# Patient Record
Sex: FEMALE | Race: BLACK OR AFRICAN AMERICAN | Employment: OTHER | URBAN - METROPOLITAN AREA
[De-identification: names, ages, dates, MRNs, and addresses within clinical notes are randomized per-mention and may not be internally consistent; named-entity substitution may affect disease eponyms.]

---

## 2017-02-02 ENCOUNTER — COMMUNICATION - HEALTHEAST (OUTPATIENT)
Dept: INTERNAL MEDICINE | Facility: CLINIC | Age: 72
End: 2017-02-02

## 2017-02-02 DIAGNOSIS — E55.9 VITAMIN D DEFICIENCY: ICD-10-CM

## 2017-03-02 ENCOUNTER — OFFICE VISIT - HEALTHEAST (OUTPATIENT)
Dept: PODIATRY | Facility: CLINIC | Age: 72
End: 2017-03-02

## 2017-03-02 DIAGNOSIS — L57.0 KERATOMA: ICD-10-CM

## 2017-03-07 ENCOUNTER — COMMUNICATION - HEALTHEAST (OUTPATIENT)
Dept: INTERNAL MEDICINE | Facility: CLINIC | Age: 72
End: 2017-03-07

## 2017-03-07 DIAGNOSIS — I63.50 UNSPECIFIED CEREBRAL ARTERY OCCLUSION WITH CEREBRAL INFARCTION: ICD-10-CM

## 2017-03-10 ENCOUNTER — COMMUNICATION - HEALTHEAST (OUTPATIENT)
Dept: INTERNAL MEDICINE | Facility: CLINIC | Age: 72
End: 2017-03-10

## 2017-03-10 DIAGNOSIS — I63.50 UNSPECIFIED CEREBRAL ARTERY OCCLUSION WITH CEREBRAL INFARCTION: ICD-10-CM

## 2017-03-21 ENCOUNTER — COMMUNICATION - HEALTHEAST (OUTPATIENT)
Dept: INTERNAL MEDICINE | Facility: CLINIC | Age: 72
End: 2017-03-21

## 2017-03-21 DIAGNOSIS — Z00.00 HEALTH CARE MAINTENANCE: ICD-10-CM

## 2017-05-02 ENCOUNTER — AMBULATORY - HEALTHEAST (OUTPATIENT)
Dept: INTERNAL MEDICINE | Facility: CLINIC | Age: 72
End: 2017-05-02

## 2017-05-02 ENCOUNTER — COMMUNICATION - HEALTHEAST (OUTPATIENT)
Dept: INTERNAL MEDICINE | Facility: CLINIC | Age: 72
End: 2017-05-02

## 2017-05-02 DIAGNOSIS — Z12.11 ENCOUNTER FOR COLORECTAL CANCER SCREENING: ICD-10-CM

## 2017-05-02 DIAGNOSIS — Z12.12 ENCOUNTER FOR COLORECTAL CANCER SCREENING: ICD-10-CM

## 2017-05-25 ENCOUNTER — OFFICE VISIT - HEALTHEAST (OUTPATIENT)
Dept: INTERNAL MEDICINE | Facility: CLINIC | Age: 72
End: 2017-05-25

## 2017-05-25 ENCOUNTER — AMBULATORY - HEALTHEAST (OUTPATIENT)
Dept: PODIATRY | Facility: CLINIC | Age: 72
End: 2017-05-25

## 2017-05-25 DIAGNOSIS — K21.9 GASTROESOPHAGEAL REFLUX DISEASE WITHOUT ESOPHAGITIS: ICD-10-CM

## 2017-05-25 DIAGNOSIS — I63.50 UNSPECIFIED CEREBRAL ARTERY OCCLUSION WITH CEREBRAL INFARCTION: ICD-10-CM

## 2017-05-25 DIAGNOSIS — E55.9 VITAMIN D DEFICIENCY: ICD-10-CM

## 2017-05-25 DIAGNOSIS — L57.0 KERATOMA: ICD-10-CM

## 2017-05-25 DIAGNOSIS — Z00.00 HEALTH CARE MAINTENANCE: ICD-10-CM

## 2017-05-25 DIAGNOSIS — G44.221 CHRONIC TENSION-TYPE HEADACHE, INTRACTABLE: ICD-10-CM

## 2017-05-25 LAB
CHOLEST SERPL-MCNC: 135 MG/DL
FASTING STATUS PATIENT QL REPORTED: NORMAL
HDLC SERPL-MCNC: 56 MG/DL
LDLC SERPL CALC-MCNC: 69 MG/DL
TRIGL SERPL-MCNC: 49 MG/DL

## 2017-05-31 ENCOUNTER — COMMUNICATION - HEALTHEAST (OUTPATIENT)
Dept: INTERNAL MEDICINE | Facility: CLINIC | Age: 72
End: 2017-05-31

## 2017-06-09 ENCOUNTER — COMMUNICATION - HEALTHEAST (OUTPATIENT)
Dept: INTERNAL MEDICINE | Facility: CLINIC | Age: 72
End: 2017-06-09

## 2017-06-09 DIAGNOSIS — K21.9 GASTROESOPHAGEAL REFLUX DISEASE WITHOUT ESOPHAGITIS: ICD-10-CM

## 2017-07-31 ENCOUNTER — COMMUNICATION - HEALTHEAST (OUTPATIENT)
Dept: INTERNAL MEDICINE | Facility: CLINIC | Age: 72
End: 2017-07-31

## 2017-11-15 ENCOUNTER — AMBULATORY - HEALTHEAST (OUTPATIENT)
Dept: PODIATRY | Facility: CLINIC | Age: 72
End: 2017-11-15

## 2017-11-15 DIAGNOSIS — L57.0 KERATOMA: ICD-10-CM

## 2017-12-29 ENCOUNTER — RECORDS - HEALTHEAST (OUTPATIENT)
Dept: ADMINISTRATIVE | Facility: OTHER | Age: 72
End: 2017-12-29

## 2018-02-13 ENCOUNTER — COMMUNICATION - HEALTHEAST (OUTPATIENT)
Dept: INTERNAL MEDICINE | Facility: CLINIC | Age: 73
End: 2018-02-13

## 2018-02-13 DIAGNOSIS — E55.9 VITAMIN D DEFICIENCY: ICD-10-CM

## 2018-02-17 ENCOUNTER — COMMUNICATION - HEALTHEAST (OUTPATIENT)
Dept: INTERNAL MEDICINE | Facility: CLINIC | Age: 73
End: 2018-02-17

## 2018-03-15 ENCOUNTER — COMMUNICATION - HEALTHEAST (OUTPATIENT)
Dept: ADMINISTRATIVE | Facility: CLINIC | Age: 73
End: 2018-03-15

## 2018-03-19 ENCOUNTER — AMBULATORY - HEALTHEAST (OUTPATIENT)
Dept: PODIATRY | Facility: CLINIC | Age: 73
End: 2018-03-19

## 2018-03-19 ENCOUNTER — OFFICE VISIT - HEALTHEAST (OUTPATIENT)
Dept: INTERNAL MEDICINE | Facility: CLINIC | Age: 73
End: 2018-03-19

## 2018-03-19 DIAGNOSIS — I63.50 CEREBRAL ARTERY OCCLUSION WITH CEREBRAL INFARCTION (H): ICD-10-CM

## 2018-03-19 DIAGNOSIS — H54.8 LEGAL BLINDNESS, AS DEFINED IN USA: ICD-10-CM

## 2018-03-19 DIAGNOSIS — H61.23 BILATERAL IMPACTED CERUMEN: ICD-10-CM

## 2018-03-19 DIAGNOSIS — J45.909 ASTHMA: ICD-10-CM

## 2018-03-19 DIAGNOSIS — Z12.11 COLON CANCER SCREENING: ICD-10-CM

## 2018-03-19 DIAGNOSIS — Z13.220 ENCOUNTER FOR SCREENING FOR LIPOID DISORDERS: ICD-10-CM

## 2018-03-19 DIAGNOSIS — L57.0 KERATOMA: ICD-10-CM

## 2018-03-19 DIAGNOSIS — Z00.00 ROUTINE GENERAL MEDICAL EXAMINATION AT A HEALTH CARE FACILITY: ICD-10-CM

## 2018-03-19 DIAGNOSIS — H91.90 HEARING LOSS: ICD-10-CM

## 2018-03-19 LAB
ALBUMIN SERPL-MCNC: 3.7 G/DL (ref 3.5–5)
ALP SERPL-CCNC: 91 U/L (ref 45–120)
ALT SERPL W P-5'-P-CCNC: 23 U/L (ref 0–45)
ANION GAP SERPL CALCULATED.3IONS-SCNC: 11 MMOL/L (ref 5–18)
AST SERPL W P-5'-P-CCNC: 20 U/L (ref 0–40)
BILIRUB SERPL-MCNC: 0.9 MG/DL (ref 0–1)
BUN SERPL-MCNC: 9 MG/DL (ref 8–28)
CALCIUM SERPL-MCNC: 9.2 MG/DL (ref 8.5–10.5)
CHLORIDE BLD-SCNC: 106 MMOL/L (ref 98–107)
CHOLEST SERPL-MCNC: 150 MG/DL
CO2 SERPL-SCNC: 24 MMOL/L (ref 22–31)
CREAT SERPL-MCNC: 0.63 MG/DL (ref 0.6–1.1)
ERYTHROCYTE [DISTWIDTH] IN BLOOD BY AUTOMATED COUNT: 11.9 % (ref 11–14.5)
FASTING STATUS PATIENT QL REPORTED: YES
GFR SERPL CREATININE-BSD FRML MDRD: >60 ML/MIN/1.73M2
GLUCOSE BLD-MCNC: 89 MG/DL (ref 70–125)
HCT VFR BLD AUTO: 41.7 % (ref 35–47)
HDLC SERPL-MCNC: 56 MG/DL
HGB BLD-MCNC: 14.3 G/DL (ref 12–16)
LDLC SERPL CALC-MCNC: 83 MG/DL
MCH RBC QN AUTO: 29.7 PG (ref 27–34)
MCHC RBC AUTO-ENTMCNC: 34.2 G/DL (ref 32–36)
MCV RBC AUTO: 87 FL (ref 80–100)
PLATELET # BLD AUTO: 147 THOU/UL (ref 140–440)
PMV BLD AUTO: 9.5 FL (ref 7–10)
POTASSIUM BLD-SCNC: 4.2 MMOL/L (ref 3.5–5)
PROT SERPL-MCNC: 7.1 G/DL (ref 6–8)
RBC # BLD AUTO: 4.8 MILL/UL (ref 3.8–5.4)
SODIUM SERPL-SCNC: 141 MMOL/L (ref 136–145)
TRIGL SERPL-MCNC: 57 MG/DL
WBC: 2.4 THOU/UL (ref 4–11)

## 2018-03-19 ASSESSMENT — MIFFLIN-ST. JEOR: SCORE: 1227.02

## 2018-03-21 ENCOUNTER — COMMUNICATION - HEALTHEAST (OUTPATIENT)
Dept: INTERNAL MEDICINE | Facility: CLINIC | Age: 73
End: 2018-03-21

## 2018-05-23 ENCOUNTER — RECORDS - HEALTHEAST (OUTPATIENT)
Dept: ADMINISTRATIVE | Facility: OTHER | Age: 73
End: 2018-05-23

## 2018-07-05 ENCOUNTER — OFFICE VISIT - HEALTHEAST (OUTPATIENT)
Dept: INTERNAL MEDICINE | Facility: CLINIC | Age: 73
End: 2018-07-05

## 2018-07-05 DIAGNOSIS — Z86.73 HISTORY OF STROKE: ICD-10-CM

## 2018-07-05 DIAGNOSIS — H54.8 LEGALLY BLIND: ICD-10-CM

## 2018-07-05 DIAGNOSIS — Z86.73 HISTORY OF TIA (TRANSIENT ISCHEMIC ATTACK) AND STROKE: ICD-10-CM

## 2018-07-05 DIAGNOSIS — R41.3 MEMORY LOSS: ICD-10-CM

## 2018-07-05 DIAGNOSIS — G81.90 HEMIPARESIS (H): ICD-10-CM

## 2018-07-16 ENCOUNTER — OFFICE VISIT (OUTPATIENT)
Dept: PODIATRY | Facility: CLINIC | Age: 73
End: 2018-07-16
Payer: COMMERCIAL

## 2018-07-16 VITALS
DIASTOLIC BLOOD PRESSURE: 68 MMHG | HEIGHT: 63 IN | BODY MASS INDEX: 30.94 KG/M2 | WEIGHT: 174.6 LBS | SYSTOLIC BLOOD PRESSURE: 104 MMHG

## 2018-07-16 DIAGNOSIS — L84 CALLUS OF FOOT: ICD-10-CM

## 2018-07-16 DIAGNOSIS — M79.671 RIGHT FOOT PAIN: Primary | ICD-10-CM

## 2018-07-16 PROCEDURE — 11055 PARING/CUTG B9 HYPRKER LES 1: CPT | Performed by: PODIATRIST

## 2018-07-16 PROCEDURE — 99213 OFFICE O/P EST LOW 20 MIN: CPT | Mod: 25 | Performed by: PODIATRIST

## 2018-07-16 RX ORDER — ATORVASTATIN CALCIUM 10 MG/1
TABLET, FILM COATED ORAL
COMMUNITY
Start: 2017-05-25 | End: 2020-12-17

## 2018-07-16 NOTE — PATIENT INSTRUCTIONS
CALLUS / CORNS / IPKs  When there is excessive friction or pressure on the skin, the body responds by making the skin thicker to protect the deeper structures from becoming exposed. While this works well to protect the deeper structures, the thickened skin can increase pressure and pain.    CALLUS: Flat, diffuse thickening are simple calluses and they are usually caused by friction. Often these are the result of rubbing on a shoe or going barefoot.    CORNS: Calluses with a central core between the toes are called corns. These result from prominent joints on adjacent toes rubbing together. Theses are a symptom of bone malalignment and will always recur unless the underlying bones are addressed surgically.    IPKs: Calluses with a central core on the ball of the foot are usually IPKs (intractable plantar keratosis). These are caused by excessive pressure from the metatarsals, the bones that make up the ball of the foot. Often one of these bones is too long or too prominent.  Again, these will always recur unless the underlying bone issue is addressed. There is no cure for these. They will either go away by themselves, recur, or more could develop.    ROUTINE MAINTENANCE  1. File them down with a pumice stone or callus file a couple times a week.   2. An electric callus removing device. Amope Pedi Perfect Electronic Pedicure Foot File and Callus Remover can be a good option.   3. Lotion can be applied to soften the callus. A urea based cream such as Kersal or Vanicream or thicker cream with shea butter are good options.  4. Toe spacers or toe covers can be used for corns, gel pads can be used for other lesions on the bottom of the foot.   If there is a surgical pathology noted, such as a prominent bone, often this needs to be addressed surgically to minimize recurrence. However, sometimes the lesion simply migrates to another spot after surgery, so it is not a guaranteed cure.       [facility]  LOCATIONS  [facility]  [address]  [place] [address]  Phone: [phone]  Fax: [phone] [facility]  [address]  [place] [address]  Phone: [phone]   Fax: [phone]   [facility]  [address]  [place] [address]  Phone: [phone]  Fax: [phone] [facility]  [address]  [place] [address]  Phone: [phone]   Fax: [phone]   [facility]   [address]  [place] [address]  Phone: [phone]   Fax: [phone] * Please call any location listed to make an appointment for a casting/fitting. Your referral was sent to their central office and they will all have the order on file.

## 2018-07-16 NOTE — MR AVS SNAPSHOT
After Visit Summary   7/16/2018    Toshia Vela    MRN: [id number]           Patient Information     Date Of Birth          1945        Visit Information        Provider Department      7/16/2018 2:30 PM Mason Tobias DPM; [employer] [facility]        Care Instructions    CALLUS / CORNS / IPKs  When there is excessive friction or pressure on the skin, the body responds by making the skin thicker to protect the deeper structures from becoming exposed. While this works well to protect the deeper structures, the thickened skin can increase pressure and pain.    CALLUS: Flat, diffuse thickening are simple calluses and they are usually caused by friction. Often these are the result of rubbing on a shoe or going barefoot.    CORNS: Calluses with a central core between the toes are called corns. These result from prominent joints on adjacent toes rubbing together. Theses are a symptom of bone malalignment and will always recur unless the underlying bones are addressed surgically.    IPKs: Calluses with a central core on the ball of the foot are usually IPKs (intractable plantar keratosis). These are caused by excessive pressure from the metatarsals, the bones that make up the ball of the foot. Often one of these bones is too long or too prominent.  Again, these will always recur unless the underlying bone issue is addressed. There is no cure for these. They will either go away by themselves, recur, or more could develop.    ROUTINE MAINTENANCE  1. File them down with a pumice stone or callus file a couple times a week.   2. An electric callus removing device. Amope Pedi Perfect Electronic Pedicure Foot File and Callus Remover can be a good option.   3. Lotion can be applied to soften the callus. A urea based cream such as Kersal or Vanicream or thicker cream with shea butter are good options.  4. Toe spacers or toe covers can be used for corns, gel pads  can be used for other lesions on the bottom of the foot.   If there is a surgical pathology noted, such as a prominent bone, often this needs to be addressed surgically to minimize recurrence. However, sometimes the lesion simply migrates to another spot after surgery, so it is not a guaranteed cure.       [facility] LOCATIONS  [facility]  [address]  [place] [address]  Phone: [phone]  Fax: [phone] [facility]  [address]  [place] [address]  Phone: [phone]   Fax: [phone]   [facility]  [address]  [place] [address]  Phone: [phone]  Fax: [phone] [facility]  [address]  [place] [address]  Phone: [phone]   Fax: [phone]   [facility]   [address]  [place] [address]  Phone: [phone]   Fax: [phone] * Please call any location listed to make an appointment for a casting/fitting. Your referral was sent to their central office and they will all have the order on file.             Follow-ups after your visit        Who to contact     If you have questions or need follow up information about today's clinic visit or your schedule please contact [facility] directly at [phone].  Normal or non-critical lab and imaging results will be communicated to you by MyChart, letter or phone within 4 business days after the clinic has received the results. If you do not hear from us within 7 days, please contact the clinic through MyChart or phone. If you have a critical or abnormal lab result, we will notify you by phone as soon as possible.  Submit refill requests through [employer] or call your pharmacy and they will forward the refill request to us. Please allow 3 business days for your refill to be completed.          Additional Information About Your Visit        [employer]  "Information     [employer] lets you send messages to your doctor, view your test results, renew your prescriptions, schedule appointments and more. To sign up, go to [email or web address] . Click on \"Log in\" on the left side of the screen, which will take you to the Welcome page. Then click on \"Sign up Now\" on the right side of the page.     You will be asked to enter the access code listed below, as well as some personal information. Please follow the directions to create your username and password.     Your access code is: [email or web address]  Expires: 10/14/2018  3:31 PM     Your access code will  in 90 days. If you need help or a new code, please call your [place] clinic or [phone].        Care EveryWhere ID     This is your Care EveryWhere ID. This could be used by other organizations to access your [place] medical records  [id number]        Your Vitals Were     Height BMI (Body Mass Index)                5' 3\" (1.6 m) 30.93 kg/m2           Blood Pressure from Last 3 Encounters:   18 104/68   09/21/15 128/80   14 116/68    Weight from Last 3 Encounters:   18 174 lb 9.6 oz (79.2 kg)   14 160 lb 14.4 oz (73 kg)   14 161 lb (73 kg)              Today, you had the following     No orders found for display       Primary Care Provider Office Phone # Fax #    [facility] [phone]       [address] [address]        Equal Access to Services     [facility] CenterELIZABETH : Hadii jett Lucio, waaxda luqjus, qaybta kaalshania escobar, karen brothers. So [facility] [phone].    ATENCIÓN: Si habla español, tiene a castro disposición servicios gratuitos de asistencia lingüística. Llame al [phone].    We comply with applicable federal civil rights laws and [place] laws. We do not discriminate on the basis of race, color, national origin, age, disability, sex, sexual orientation, or gender identity.       "      Thank you!     Thank you for choosing [facility]  for your care. Our goal is always to provide you with excellent care. Hearing back from our patients is one way we can continue to improve our services. Please take a few minutes to complete the written survey that you may receive in the mail after your visit with us. Thank you!             Your Updated Medication List - Protect others around you: Learn how to safely use, store and throw away your medicines at www.disposemymeds.org.          This list is accurate as of 7/16/18  3:31 PM.  Always use your most recent med list.                   Brand Name Dispense Instructions for use Diagnosis    aspirin-dipyridamole  MG per 12 hr capsule    AGGRENOX    180 capsule    Take 1 capsule by mouth 2 times daily    Stroke, paralytic (H)       atorvastatin 10 MG tablet    LIPITOR     TAKE 1 TABLET DAILY-Please Schedule Physical Exam & Fasting Labs        Calcium carb-Vitamin D 500 mg [place]-200 units 500-200 MG-UNIT per tablet    OSCAL with D;Oyster Shell Calcium    100 tablet    Take 1 tablet by mouth 2 times daily (with meals)    Osteoporosis       CETAPHIL Crea     1 Bottle    Use it daily to the dry skin    Dry skin dermatitis       cholecalciferol 1000 UNIT tablet    vitamin D3    100 tablet    Take 1 tablet by mouth daily.    Osteopenia       erythromycin ophthalmic ointment    ROMYCIN    3.5 g    Place  into both eyes At Bedtime.        fluticasone-salmeterol 250-50 MCG/DOSE diskus inhaler    ADVAIR    1 Inhaler    Inhale 1 puff into the lungs 2 times daily Need to make appointment    Mild intermittent asthma       omeprazole 20 MG CR capsule    priLOSEC    30 capsule    Take 1 capsule (20 mg) by mouth daily Need office visit    GERD (gastroesophageal reflux disease)       PRED FORTE 1 % ophthalmic susp   Generic drug:  prednisoLONE acetate      Place 1 drop into the right eye 2 times daily        triamcinolone 0.1 % cream    KENALOG     30 g    Apply sparingly to affected area twice a day    Skin rash       UNKNOWN TO PATIENT      Medication for hypertension

## 2018-07-16 NOTE — PROGRESS NOTES
"  ASSESSMENT/PLAN:    Encounter Diagnoses   Name Primary?     Right foot pain Yes     Callus of foot       was very helpful with our communication.  I explained that the lesion is not a plantar wart and that freezing it would not resolve the problem. I explained that the lesion is from high pressure in that location. This might be related to thinning of the plantar fat pad and the underlying sesamoid bone.    I paired the lesion down with a # 15 scalpel. No bleeding.  It was recommended that she file it with a pumice stone.  We discussed cushioned inserts with an aperture cut below the lesion.  It was explained that stiffer soled shoes might help reduce pressure on the forefoot.    Pt is referred to the [facility] for prescription orthoses.      Body mass index is 30.93 kg/(m^2).    Weight management plan: Patient was referred to their PCP to discuss a diet and exercise plan.      Mason Tobias DPM, FACFAS, MS    [facility]      ____________________________________________________________________    HPI:         Chief Complaint: pain, bottom of right foot. She has a lesion and inquires about \"freezing\" it.  Onset of problem: 5 years  Ratin/10   Frequency:  daily    The pain is made worse with walking    *  Patient Active Problem List   Diagnosis     Stroke, paralytic (H)     Seasonal allergies     Hypertension goal BP (blood pressure) < 130/80     Mild intermittent asthma     GERD (gastroesophageal reflux disease)     Ankle fracture, right     Chronic headaches     Health Care Home     Weakness of left side of body     Osteopenia     Advanced directives, counseling/discussion     Hyperlipidemia LDL goal <100   *  *  Past Surgical History:   Procedure Laterality Date     ENUCLEATION - left eye     *  *  Current Outpatient Prescriptions   Medication Sig Dispense Refill     atorvastatin (LIPITOR) 10 MG tablet TAKE 1 TABLET " DAILY-Please Schedule Physical Exam & Fasting Labs       UNKNOWN TO PATIENT Medication for hypertension       aspirin-dipyridamole (AGGRENOX)  MG per 12 hr capsule Take 1 capsule by mouth 2 times daily 180 capsule 0     calcium carb 1250 mg, 500 mg [place],/vitamin D 200 units (OSCAL WITH D) 500-200 MG-UNIT per tablet Take 1 tablet by mouth 2 times daily (with meals) 100 tablet 0     cholecalciferol (VITAMIN D) 1000 UNIT tablet Take 1 tablet by mouth daily. 100 tablet 3     Emollient (CETAPHIL) CREA Use it daily to the dry skin 1 Bottle 1     erythromycin (ROMYCIN) ophthalmic ointment Place  into both eyes At Bedtime. 3.5 g 0     fluticasone-salmeterol (ADVAIR) 250-50 MCG/DOSE diskus inhaler Inhale 1 puff into the lungs 2 times daily Need to make appointment (Patient not taking: Reported on 7/16/2018) 1 Inhaler 0     omeprazole (PRILOSEC) 20 MG capsule Take 1 capsule (20 mg) by mouth daily Need office visit 30 capsule 1     PRED FORTE 1 % ophthalmic suspension Place 1 drop into the right eye 2 times daily       triamcinolone (KENALOG) 0.1 % cream Apply sparingly to affected area twice a day 30 g 0       ROS:     A 10-point review of systems was performed and is positive for that noted in the HPI and as seen below.  All other areas are negative.     Numbness in feet?  no   Calf pain with walking? no  Recent foot/ankle injury? no  Weight change over past 12 months? no  Self perception as overweight? no  Recent flu-like symptoms? no  Joint pain other than feet ? no    Social History:   Social History     Social History     Marital status: Single     Spouse name: N/A     Number of children: N/A     Years of education: N/A     Occupational History     Not on file.     Social History Main Topics     Smoking status: Never Smoker     Smokeless tobacco: Never Used     Alcohol use No     Drug use: No     Sexual activity: Not Currently     Other Topics Concern     Not on file     Social History Narrative       Family  "history:  No family history on file.    EXAM:    Vitals: /68  Ht 5' 3\" (1.6 m)  Wt 174 lb 9.6 oz (79.2 kg)  BMI 30.93 kg/m2  BMI: Body mass index is 30.93 kg/(m^2).  Height: 5' 3\"    Constitutional/ general:  Pt is in no apparent distress, appears well-nourished.  Cooperative with history and physical exam.     Vascular:  Pedal pulses are palpable bilaterally for both the DP and PT arteries.  CFT < 3 sec.  No edema.  Pedal hair growth noted.     Neuro:  Alert and oriented x 3. Coordinated gait.  Light touch sensation is intact to the L4, L5, S1 distributions. No obvious deficits.  No evidence of neurological-based weakness, spasticity, or contracture in the lower extremities.     Derm: Normal texture and turgor.  No erythema, ecchymosis, or cyanosis.  No open lesions.  Thick, nucleated hyperkeratotic lesion sub right first met head.  Painful.  Did not appear verrucoid.     Musculoskeletal:    Lower extremity muscle strength is normal.  Patient is ambulatory without an assistive device or brace.  No gross deformities.  Flat foot structure.      Mason Tobias DPM, FACTAD, MS    [facility]              "

## 2018-07-16 NOTE — LETTER
"    2018         RE: Toshia Vela  [address]  [place] [facility] [place] [address]        Dear Colleague,    Thank you for referring your patient, Toshia Vela, to the [facility]. Please see a copy of my visit note below.      ASSESSMENT/PLAN:    Encounter Diagnoses   Name Primary?     Right foot pain Yes     Callus of foot       was very helpful with our communication.  I explained that the lesion is not a plantar wart and that freezing it would not resolve the problem. I explained that the lesion is from high pressure in that location. This might be related to thinning of the plantar fat pad and the underlying sesamoid bone.    I paired the lesion down with a # 15 scalpel. No bleeding.  It was recommended that she file it with a pumice stone.  We discussed cushioned inserts with an aperture cut below the lesion.  It was explained that stiffer soled shoes might help reduce pressure on the forefoot.    Pt is referred to the [facility] for prescription orthoses.      Body mass index is 30.93 kg/(m^2).    Weight management plan: Patient was referred to their PCP to discuss a diet and exercise plan.      Mason Tobias DPM, FACFAS, MS    [facility]      ____________________________________________________________________    HPI:         Chief Complaint: pain, bottom of right foot. She has a lesion and inquires about \"freezing\" it.  Onset of problem: 5 years  Ratin/10   Frequency:  daily    The pain is made worse with walking    *  Patient Active Problem List   Diagnosis     Stroke, paralytic (H)     Seasonal allergies     Hypertension goal BP (blood pressure) < 130/80     Mild intermittent asthma     GERD (gastroesophageal reflux disease)     Ankle fracture, right     Chronic headaches     Health Care Home     Weakness of left side of body     Osteopenia     Advanced directives, " counseling/discussion     Hyperlipidemia LDL goal <100   *  *  Past Surgical History:   Procedure Laterality Date     ENUCLEATION - left eye     *  *  Current Outpatient Prescriptions   Medication Sig Dispense Refill     atorvastatin (LIPITOR) 10 MG tablet TAKE 1 TABLET DAILY-Please Schedule Physical Exam & Fasting Labs       UNKNOWN TO PATIENT Medication for hypertension       aspirin-dipyridamole (AGGRENOX)  MG per 12 hr capsule Take 1 capsule by mouth 2 times daily 180 capsule 0     calcium carb 1250 mg, 500 mg [place],/vitamin D 200 units (OSCAL WITH D) 500-200 MG-UNIT per tablet Take 1 tablet by mouth 2 times daily (with meals) 100 tablet 0     cholecalciferol (VITAMIN D) 1000 UNIT tablet Take 1 tablet by mouth daily. 100 tablet 3     Emollient (CETAPHIL) CREA Use it daily to the dry skin 1 Bottle 1     erythromycin (ROMYCIN) ophthalmic ointment Place  into both eyes At Bedtime. 3.5 g 0     fluticasone-salmeterol (ADVAIR) 250-50 MCG/DOSE diskus inhaler Inhale 1 puff into the lungs 2 times daily Need to make appointment (Patient not taking: Reported on 7/16/2018) 1 Inhaler 0     omeprazole (PRILOSEC) 20 MG capsule Take 1 capsule (20 mg) by mouth daily Need office visit 30 capsule 1     PRED FORTE 1 % ophthalmic suspension Place 1 drop into the right eye 2 times daily       triamcinolone (KENALOG) 0.1 % cream Apply sparingly to affected area twice a day 30 g 0       ROS:     A 10-point review of systems was performed and is positive for that noted in the HPI and as seen below.  All other areas are negative.     Numbness in feet?  no   Calf pain with walking? no  Recent foot/ankle injury? no  Weight change over past 12 months? no  Self perception as overweight? no  Recent flu-like symptoms? no  Joint pain other than feet ? no    Social History:   Social History     Social History     Marital status: Single     Spouse name: N/A     Number of children: N/A     Years of education: N/A     Occupational History      "Not on file.     Social History Main Topics     Smoking status: Never Smoker     Smokeless tobacco: Never Used     Alcohol use No     Drug use: No     Sexual activity: Not Currently     Other Topics Concern     Not on file     Social History Narrative       Family history:  No family history on file.    EXAM:    Vitals: /68  Ht 5' 3\" (1.6 m)  Wt 174 lb 9.6 oz (79.2 kg)  BMI 30.93 kg/m2  BMI: Body mass index is 30.93 kg/(m^2).  Height: 5' 3\"    Constitutional/ general:  Pt is in no apparent distress, appears well-nourished.  Cooperative with history and physical exam.     Vascular:  Pedal pulses are palpable bilaterally for both the DP and PT arteries.  CFT < 3 sec.  No edema.  Pedal hair growth noted.     Neuro:  Alert and oriented x 3. Coordinated gait.  Light touch sensation is intact to the L4, L5, S1 distributions. No obvious deficits.  No evidence of neurological-based weakness, spasticity, or contracture in the lower extremities.     Derm: Normal texture and turgor.  No erythema, ecchymosis, or cyanosis.  No open lesions.  Thick, nucleated hyperkeratotic lesion sub right first met head.  Painful.  Did not appear verrucoid.     Musculoskeletal:    Lower extremity muscle strength is normal.  Patient is ambulatory without an assistive device or brace.  No gross deformities.  Flat foot structure.      Mason Tobias DPM, FACFAS, MS    [place] [facility]le [facility]                Again, thank you for allowing me to participate in the care of your patient.        Sincerely,        Mason Tobias DPM    "

## 2018-09-06 ENCOUNTER — COMMUNICATION - HEALTHEAST (OUTPATIENT)
Dept: INTERNAL MEDICINE | Facility: CLINIC | Age: 73
End: 2018-09-06

## 2018-09-06 ENCOUNTER — RECORDS - HEALTHEAST (OUTPATIENT)
Dept: ADMINISTRATIVE | Facility: OTHER | Age: 73
End: 2018-09-06

## 2018-09-06 DIAGNOSIS — E55.9 VITAMIN D DEFICIENCY: ICD-10-CM

## 2018-09-06 DIAGNOSIS — K21.9 GASTROESOPHAGEAL REFLUX DISEASE WITHOUT ESOPHAGITIS: ICD-10-CM

## 2018-09-06 DIAGNOSIS — I63.50 CEREBRAL ARTERY OCCLUSION WITH CEREBRAL INFARCTION (H): ICD-10-CM

## 2018-09-06 DIAGNOSIS — Z00.00 HEALTH CARE MAINTENANCE: ICD-10-CM

## 2018-09-07 ENCOUNTER — COMMUNICATION - HEALTHEAST (OUTPATIENT)
Dept: INTERNAL MEDICINE | Facility: CLINIC | Age: 73
End: 2018-09-07

## 2018-11-06 ENCOUNTER — COMMUNICATION - HEALTHEAST (OUTPATIENT)
Dept: INTERNAL MEDICINE | Facility: CLINIC | Age: 73
End: 2018-11-06

## 2018-11-29 ENCOUNTER — COMMUNICATION - HEALTHEAST (OUTPATIENT)
Dept: INTERNAL MEDICINE | Facility: CLINIC | Age: 73
End: 2018-11-29

## 2018-12-04 ENCOUNTER — COMMUNICATION - HEALTHEAST (OUTPATIENT)
Dept: INTERNAL MEDICINE | Facility: CLINIC | Age: 73
End: 2018-12-04

## 2018-12-12 ENCOUNTER — COMMUNICATION - HEALTHEAST (OUTPATIENT)
Dept: INTERNAL MEDICINE | Facility: CLINIC | Age: 73
End: 2018-12-12

## 2018-12-12 ENCOUNTER — OFFICE VISIT - HEALTHEAST (OUTPATIENT)
Dept: INTERNAL MEDICINE | Facility: CLINIC | Age: 73
End: 2018-12-12

## 2018-12-12 DIAGNOSIS — Z12.31 VISIT FOR SCREENING MAMMOGRAM: ICD-10-CM

## 2018-12-12 DIAGNOSIS — R41.3 MEMORY LOSS: ICD-10-CM

## 2018-12-12 DIAGNOSIS — I63.50 CEREBRAL ARTERY OCCLUSION WITH CEREBRAL INFARCTION (H): ICD-10-CM

## 2018-12-12 DIAGNOSIS — H54.8 LEGAL BLINDNESS, AS DEFINED IN USA: ICD-10-CM

## 2018-12-12 DIAGNOSIS — K21.9 GASTROESOPHAGEAL REFLUX DISEASE WITHOUT ESOPHAGITIS: ICD-10-CM

## 2018-12-12 ASSESSMENT — MIFFLIN-ST. JEOR: SCORE: 1279.24

## 2018-12-13 ENCOUNTER — COMMUNICATION - HEALTHEAST (OUTPATIENT)
Dept: INTERNAL MEDICINE | Facility: CLINIC | Age: 73
End: 2018-12-13

## 2019-01-02 ENCOUNTER — OFFICE VISIT - HEALTHEAST (OUTPATIENT)
Dept: INTERNAL MEDICINE | Facility: CLINIC | Age: 74
End: 2019-01-02

## 2019-01-02 DIAGNOSIS — I63.50 CEREBRAL ARTERY OCCLUSION WITH CEREBRAL INFARCTION (H): ICD-10-CM

## 2019-01-02 DIAGNOSIS — R41.3 MEMORY LOSS: ICD-10-CM

## 2019-01-02 DIAGNOSIS — R13.10 DYSPHAGIA, UNSPECIFIED TYPE: ICD-10-CM

## 2019-01-02 DIAGNOSIS — H54.8 LEGAL BLINDNESS, AS DEFINED IN USA: ICD-10-CM

## 2019-01-02 ASSESSMENT — MIFFLIN-ST. JEOR: SCORE: 1256.11

## 2019-01-15 ENCOUNTER — COMMUNICATION - HEALTHEAST (OUTPATIENT)
Dept: INTERNAL MEDICINE | Facility: CLINIC | Age: 74
End: 2019-01-15

## 2019-05-01 ENCOUNTER — COMMUNICATION - HEALTHEAST (OUTPATIENT)
Dept: INTERNAL MEDICINE | Facility: CLINIC | Age: 74
End: 2019-05-01

## 2019-05-08 ENCOUNTER — COMMUNICATION - HEALTHEAST (OUTPATIENT)
Dept: INTERNAL MEDICINE | Facility: CLINIC | Age: 74
End: 2019-05-08

## 2019-05-16 ENCOUNTER — COMMUNICATION - HEALTHEAST (OUTPATIENT)
Dept: INTERNAL MEDICINE | Facility: CLINIC | Age: 74
End: 2019-05-16

## 2019-05-23 ENCOUNTER — COMMUNICATION - HEALTHEAST (OUTPATIENT)
Dept: GERIATRIC MEDICINE | Facility: CLINIC | Age: 74
End: 2019-05-23

## 2019-06-21 ENCOUNTER — COMMUNICATION - HEALTHEAST (OUTPATIENT)
Dept: INTERNAL MEDICINE | Facility: CLINIC | Age: 74
End: 2019-06-21

## 2019-06-21 DIAGNOSIS — E55.9 VITAMIN D DEFICIENCY: ICD-10-CM

## 2019-07-23 ENCOUNTER — COMMUNICATION - HEALTHEAST (OUTPATIENT)
Dept: INTERNAL MEDICINE | Facility: CLINIC | Age: 74
End: 2019-07-23

## 2019-07-23 DIAGNOSIS — E55.9 VITAMIN D DEFICIENCY: ICD-10-CM

## 2019-08-19 ENCOUNTER — OFFICE VISIT (OUTPATIENT)
Dept: INTERNAL MEDICINE | Facility: CLINIC | Age: 74
End: 2019-08-19
Payer: COMMERCIAL

## 2019-08-19 VITALS
HEART RATE: 76 BPM | WEIGHT: 177 LBS | SYSTOLIC BLOOD PRESSURE: 128 MMHG | OXYGEN SATURATION: 96 % | DIASTOLIC BLOOD PRESSURE: 70 MMHG | RESPIRATION RATE: 18 BRPM | BODY MASS INDEX: 31.35 KG/M2

## 2019-08-19 DIAGNOSIS — G47.9 TROUBLE IN SLEEPING: Primary | ICD-10-CM

## 2019-08-19 PROCEDURE — 99202 OFFICE O/P NEW SF 15 MIN: CPT | Performed by: INTERNAL MEDICINE

## 2019-09-13 ENCOUNTER — COMMUNICATION - HEALTHEAST (OUTPATIENT)
Dept: GERIATRIC MEDICINE | Facility: CLINIC | Age: 74
End: 2019-09-13

## 2019-10-06 ENCOUNTER — COMMUNICATION - HEALTHEAST (OUTPATIENT)
Dept: INTERNAL MEDICINE | Facility: CLINIC | Age: 74
End: 2019-10-06

## 2019-10-06 DIAGNOSIS — I63.50 CEREBRAL ARTERY OCCLUSION WITH CEREBRAL INFARCTION (H): ICD-10-CM

## 2019-10-10 ENCOUNTER — COMMUNICATION - HEALTHEAST (OUTPATIENT)
Dept: GERIATRIC MEDICINE | Facility: CLINIC | Age: 74
End: 2019-10-10

## 2019-10-11 ENCOUNTER — COMMUNICATION - HEALTHEAST (OUTPATIENT)
Dept: GERIATRIC MEDICINE | Facility: CLINIC | Age: 74
End: 2019-10-11

## 2019-10-11 ASSESSMENT — ACTIVITIES OF DAILY LIVING (ADL)
DEPENDENT_IADLS:: CLEANING;COOKING;LAUNDRY;SHOPPING;MEAL PREPARATION;MEDICATION MANAGEMENT;MONEY MANAGEMENT;TRANSPORTATION;INCONTINENCE

## 2019-10-14 ENCOUNTER — COMMUNICATION - HEALTHEAST (OUTPATIENT)
Dept: GERIATRIC MEDICINE | Facility: CLINIC | Age: 74
End: 2019-10-14

## 2019-10-18 ENCOUNTER — COMMUNICATION - HEALTHEAST (OUTPATIENT)
Dept: GERIATRIC MEDICINE | Facility: CLINIC | Age: 74
End: 2019-10-18

## 2019-10-30 ENCOUNTER — COMMUNICATION - HEALTHEAST (OUTPATIENT)
Dept: GERIATRIC MEDICINE | Facility: CLINIC | Age: 74
End: 2019-10-30

## 2019-11-05 ENCOUNTER — OFFICE VISIT - HEALTHEAST (OUTPATIENT)
Dept: INTERNAL MEDICINE | Facility: CLINIC | Age: 74
End: 2019-11-05

## 2019-11-05 DIAGNOSIS — K21.9 GASTROESOPHAGEAL REFLUX DISEASE WITHOUT ESOPHAGITIS: ICD-10-CM

## 2019-11-05 DIAGNOSIS — Z12.11 SCREEN FOR COLON CANCER: ICD-10-CM

## 2019-11-05 DIAGNOSIS — G44.209 TENSION HEADACHE: ICD-10-CM

## 2019-11-05 DIAGNOSIS — Z23 FLU VACCINE NEED: ICD-10-CM

## 2019-11-05 DIAGNOSIS — Z13.220 ENCOUNTER FOR SCREENING FOR LIPOID DISORDERS: ICD-10-CM

## 2019-11-05 DIAGNOSIS — R26.89 POOR BALANCE: ICD-10-CM

## 2019-11-05 DIAGNOSIS — E55.9 VITAMIN D DEFICIENCY: ICD-10-CM

## 2019-11-05 DIAGNOSIS — Z00.00 ROUTINE GENERAL MEDICAL EXAMINATION AT A HEALTH CARE FACILITY: ICD-10-CM

## 2019-11-05 DIAGNOSIS — Z00.00 HEALTH CARE MAINTENANCE: ICD-10-CM

## 2019-11-05 DIAGNOSIS — J45.909 UNCOMPLICATED ASTHMA, UNSPECIFIED ASTHMA SEVERITY, UNSPECIFIED WHETHER PERSISTENT: ICD-10-CM

## 2019-11-05 DIAGNOSIS — I63.50 CEREBRAL ARTERY OCCLUSION WITH CEREBRAL INFARCTION (H): ICD-10-CM

## 2019-11-05 DIAGNOSIS — R41.3 MEMORY LOSS: ICD-10-CM

## 2019-11-05 LAB
ALBUMIN SERPL-MCNC: 3.9 G/DL (ref 3.5–5)
ALP SERPL-CCNC: 90 U/L (ref 45–120)
ALT SERPL W P-5'-P-CCNC: 23 U/L (ref 0–45)
ANION GAP SERPL CALCULATED.3IONS-SCNC: 9 MMOL/L (ref 5–18)
AST SERPL W P-5'-P-CCNC: 19 U/L (ref 0–40)
BILIRUB DIRECT SERPL-MCNC: 0.3 MG/DL
BILIRUB SERPL-MCNC: 0.8 MG/DL (ref 0–1)
BUN SERPL-MCNC: 8 MG/DL (ref 8–28)
CALCIUM SERPL-MCNC: 9.2 MG/DL (ref 8.5–10.5)
CHLORIDE BLD-SCNC: 105 MMOL/L (ref 98–107)
CHOLEST SERPL-MCNC: 141 MG/DL
CO2 SERPL-SCNC: 26 MMOL/L (ref 22–31)
CREAT SERPL-MCNC: 0.6 MG/DL (ref 0.6–1.1)
ERYTHROCYTE [DISTWIDTH] IN BLOOD BY AUTOMATED COUNT: 11.3 % (ref 11–14.5)
FASTING STATUS PATIENT QL REPORTED: YES
GFR SERPL CREATININE-BSD FRML MDRD: >60 ML/MIN/1.73M2
GLUCOSE BLD-MCNC: 87 MG/DL (ref 70–125)
HCT VFR BLD AUTO: 41.6 % (ref 35–47)
HDLC SERPL-MCNC: 55 MG/DL
HGB BLD-MCNC: 13.7 G/DL (ref 12–16)
LDLC SERPL CALC-MCNC: 77 MG/DL
MCH RBC QN AUTO: 30 PG (ref 27–34)
MCHC RBC AUTO-ENTMCNC: 32.9 G/DL (ref 32–36)
MCV RBC AUTO: 91 FL (ref 80–100)
PLATELET # BLD AUTO: 188 THOU/UL (ref 140–440)
PMV BLD AUTO: 8.7 FL (ref 7–10)
POTASSIUM BLD-SCNC: 4.4 MMOL/L (ref 3.5–5)
PROT SERPL-MCNC: 7.2 G/DL (ref 6–8)
RBC # BLD AUTO: 4.57 MILL/UL (ref 3.8–5.4)
SODIUM SERPL-SCNC: 140 MMOL/L (ref 136–145)
TRIGL SERPL-MCNC: 46 MG/DL
TSH SERPL DL<=0.005 MIU/L-ACNC: 1.18 UIU/ML (ref 0.3–5)
VIT B12 SERPL-MCNC: 759 PG/ML (ref 213–816)
WBC: 2.7 THOU/UL (ref 4–11)

## 2019-11-05 ASSESSMENT — MIFFLIN-ST. JEOR: SCORE: 1276.35

## 2019-11-06 ENCOUNTER — COMMUNICATION - HEALTHEAST (OUTPATIENT)
Dept: GERIATRIC MEDICINE | Facility: CLINIC | Age: 74
End: 2019-11-06

## 2019-11-06 ENCOUNTER — COMMUNICATION - HEALTHEAST (OUTPATIENT)
Dept: INTERNAL MEDICINE | Facility: CLINIC | Age: 74
End: 2019-11-06

## 2019-11-13 ENCOUNTER — COMMUNICATION - HEALTHEAST (OUTPATIENT)
Dept: INTERNAL MEDICINE | Facility: CLINIC | Age: 74
End: 2019-11-13

## 2019-12-03 ENCOUNTER — COMMUNICATION - HEALTHEAST (OUTPATIENT)
Dept: INTERNAL MEDICINE | Facility: CLINIC | Age: 74
End: 2019-12-03

## 2019-12-03 DIAGNOSIS — I63.9 CEREBROVASCULAR ACCIDENT (CVA), UNSPECIFIED MECHANISM (H): ICD-10-CM

## 2019-12-04 ENCOUNTER — AMBULATORY - HEALTHEAST (OUTPATIENT)
Dept: INTERNAL MEDICINE | Facility: CLINIC | Age: 74
End: 2019-12-04

## 2020-04-02 ENCOUNTER — COMMUNICATION - HEALTHEAST (OUTPATIENT)
Dept: GERIATRIC MEDICINE | Facility: CLINIC | Age: 75
End: 2020-04-02

## 2020-05-05 ENCOUNTER — COMMUNICATION - HEALTHEAST (OUTPATIENT)
Dept: INTERNAL MEDICINE | Facility: CLINIC | Age: 75
End: 2020-05-05

## 2020-05-05 DIAGNOSIS — I63.9 CEREBROVASCULAR ACCIDENT (CVA), UNSPECIFIED MECHANISM (H): ICD-10-CM

## 2020-09-28 ENCOUNTER — COMMUNICATION - HEALTHEAST (OUTPATIENT)
Dept: SCHEDULING | Facility: CLINIC | Age: 75
End: 2020-09-28

## 2020-09-28 ENCOUNTER — NURSE TRIAGE (OUTPATIENT)
Dept: NURSING | Facility: CLINIC | Age: 75
End: 2020-09-28

## 2020-09-29 ENCOUNTER — OFFICE VISIT - HEALTHEAST (OUTPATIENT)
Dept: FAMILY MEDICINE | Facility: CLINIC | Age: 75
End: 2020-09-29

## 2020-09-29 ENCOUNTER — COMMUNICATION - HEALTHEAST (OUTPATIENT)
Dept: GERIATRIC MEDICINE | Facility: CLINIC | Age: 75
End: 2020-09-29

## 2020-09-29 DIAGNOSIS — G44.201 INTRACTABLE TENSION-TYPE HEADACHE, UNSPECIFIED CHRONICITY PATTERN: ICD-10-CM

## 2020-09-29 DIAGNOSIS — Z12.31 VISIT FOR SCREENING MAMMOGRAM: ICD-10-CM

## 2020-09-29 DIAGNOSIS — Z78.9 ENROLLED IN CHRONIC CARE MANAGEMENT: ICD-10-CM

## 2020-10-06 ENCOUNTER — COMMUNICATION - HEALTHEAST (OUTPATIENT)
Dept: GERIATRIC MEDICINE | Facility: CLINIC | Age: 75
End: 2020-10-06

## 2020-10-06 ASSESSMENT — ACTIVITIES OF DAILY LIVING (ADL)
DEPENDENT_IADLS:: CLEANING;COOKING;LAUNDRY;SHOPPING;MEAL PREPARATION;MEDICATION MANAGEMENT;MONEY MANAGEMENT;TRANSPORTATION

## 2020-10-09 ENCOUNTER — COMMUNICATION - HEALTHEAST (OUTPATIENT)
Dept: GERIATRIC MEDICINE | Facility: CLINIC | Age: 75
End: 2020-10-09

## 2020-11-04 ENCOUNTER — COMMUNICATION - HEALTHEAST (OUTPATIENT)
Dept: INTERNAL MEDICINE | Facility: CLINIC | Age: 75
End: 2020-11-04

## 2020-11-04 DIAGNOSIS — Z00.00 HEALTH CARE MAINTENANCE: ICD-10-CM

## 2020-11-22 ENCOUNTER — COMMUNICATION - HEALTHEAST (OUTPATIENT)
Dept: INTERNAL MEDICINE | Facility: CLINIC | Age: 75
End: 2020-11-22

## 2020-11-22 DIAGNOSIS — I63.9 CEREBROVASCULAR ACCIDENT (CVA), UNSPECIFIED MECHANISM (H): ICD-10-CM

## 2020-12-17 ENCOUNTER — APPOINTMENT (OUTPATIENT)
Dept: MRI IMAGING | Facility: CLINIC | Age: 75
End: 2020-12-17
Attending: EMERGENCY MEDICINE
Payer: COMMERCIAL

## 2020-12-17 ENCOUNTER — HOSPITAL ENCOUNTER (EMERGENCY)
Facility: CLINIC | Age: 75
Discharge: ANOTHER HOSPITAL | End: 2020-12-18
Attending: EMERGENCY MEDICINE | Admitting: EMERGENCY MEDICINE
Payer: COMMERCIAL

## 2020-12-17 ENCOUNTER — APPOINTMENT (OUTPATIENT)
Dept: CT IMAGING | Facility: CLINIC | Age: 75
End: 2020-12-17
Attending: EMERGENCY MEDICINE
Payer: COMMERCIAL

## 2020-12-17 DIAGNOSIS — I63.9 CEREBROVASCULAR ACCIDENT (CVA), UNSPECIFIED MECHANISM (H): ICD-10-CM

## 2020-12-17 DIAGNOSIS — R07.9 CHEST PAIN, UNSPECIFIED TYPE: ICD-10-CM

## 2020-12-17 LAB
ANION GAP SERPL CALCULATED.3IONS-SCNC: 6 MMOL/L (ref 3–14)
BASOPHILS # BLD AUTO: 0 10E9/L (ref 0–0.2)
BASOPHILS NFR BLD AUTO: 0.4 %
BUN SERPL-MCNC: 10 MG/DL (ref 7–30)
CALCIUM SERPL-MCNC: 9 MG/DL (ref 8.5–10.1)
CHLORIDE SERPL-SCNC: 107 MMOL/L (ref 94–109)
CO2 SERPL-SCNC: 28 MMOL/L (ref 20–32)
CREAT SERPL-MCNC: 0.56 MG/DL (ref 0.52–1.04)
DIFFERENTIAL METHOD BLD: NORMAL
EOSINOPHIL # BLD AUTO: 0 10E9/L (ref 0–0.7)
EOSINOPHIL NFR BLD AUTO: 0.2 %
ERYTHROCYTE [DISTWIDTH] IN BLOOD BY AUTOMATED COUNT: 12.3 % (ref 10–15)
GFR SERPL CREATININE-BSD FRML MDRD: >90 ML/MIN/{1.73_M2}
GLUCOSE BLDC GLUCOMTR-MCNC: 113 MG/DL (ref 70–99)
GLUCOSE SERPL-MCNC: 117 MG/DL (ref 70–99)
HCT VFR BLD AUTO: 40.2 % (ref 35–47)
HGB BLD-MCNC: 13.2 G/DL (ref 11.7–15.7)
IMM GRANULOCYTES # BLD: 0 10E9/L (ref 0–0.4)
IMM GRANULOCYTES NFR BLD: 0.6 %
INR PPP: 1 (ref 0.86–1.14)
LYMPHOCYTES # BLD AUTO: 0.8 10E9/L (ref 0.8–5.3)
LYMPHOCYTES NFR BLD AUTO: 15.2 %
MCH RBC QN AUTO: 31.1 PG (ref 26.5–33)
MCHC RBC AUTO-ENTMCNC: 32.8 G/DL (ref 31.5–36.5)
MCV RBC AUTO: 95 FL (ref 78–100)
MONOCYTES # BLD AUTO: 0.3 10E9/L (ref 0–1.3)
MONOCYTES NFR BLD AUTO: 6.3 %
NEUTROPHILS # BLD AUTO: 3.9 10E9/L (ref 1.6–8.3)
NEUTROPHILS NFR BLD AUTO: 77.3 %
NRBC # BLD AUTO: 0 10*3/UL
NRBC BLD AUTO-RTO: 0 /100
PLATELET # BLD AUTO: 189 10E9/L (ref 150–450)
POTASSIUM SERPL-SCNC: 3.2 MMOL/L (ref 3.4–5.3)
RBC # BLD AUTO: 4.24 10E12/L (ref 3.8–5.2)
SODIUM SERPL-SCNC: 141 MMOL/L (ref 133–144)
TROPONIN I SERPL-MCNC: <0.015 UG/L (ref 0–0.04)
WBC # BLD AUTO: 5.1 10E9/L (ref 4–11)

## 2020-12-17 PROCEDURE — 85025 COMPLETE CBC W/AUTO DIFF WBC: CPT | Performed by: EMERGENCY MEDICINE

## 2020-12-17 PROCEDURE — 250N000011 HC RX IP 250 OP 636: Performed by: EMERGENCY MEDICINE

## 2020-12-17 PROCEDURE — 70544 MR ANGIOGRAPHY HEAD W/O DYE: CPT | Mod: XS

## 2020-12-17 PROCEDURE — 99285 EMERGENCY DEPT VISIT HI MDM: CPT | Mod: 25

## 2020-12-17 PROCEDURE — 87636 SARSCOV2 & INF A&B AMP PRB: CPT | Performed by: EMERGENCY MEDICINE

## 2020-12-17 PROCEDURE — 70551 MRI BRAIN STEM W/O DYE: CPT

## 2020-12-17 PROCEDURE — C9803 HOPD COVID-19 SPEC COLLECT: HCPCS

## 2020-12-17 PROCEDURE — 250N000013 HC RX MED GY IP 250 OP 250 PS 637: Performed by: EMERGENCY MEDICINE

## 2020-12-17 PROCEDURE — 96374 THER/PROPH/DIAG INJ IV PUSH: CPT | Mod: 59

## 2020-12-17 PROCEDURE — 84484 ASSAY OF TROPONIN QUANT: CPT | Performed by: EMERGENCY MEDICINE

## 2020-12-17 PROCEDURE — 250N000009 HC RX 250: Performed by: EMERGENCY MEDICINE

## 2020-12-17 PROCEDURE — 93005 ELECTROCARDIOGRAM TRACING: CPT

## 2020-12-17 PROCEDURE — 258N000003 HC RX IP 258 OP 636: Performed by: EMERGENCY MEDICINE

## 2020-12-17 PROCEDURE — 999N001017 HC STATISTIC GLUCOSE BY METER IP

## 2020-12-17 PROCEDURE — 96361 HYDRATE IV INFUSION ADD-ON: CPT

## 2020-12-17 PROCEDURE — 70547 MR ANGIOGRAPHY NECK W/O DYE: CPT

## 2020-12-17 PROCEDURE — 80048 BASIC METABOLIC PNL TOTAL CA: CPT | Performed by: EMERGENCY MEDICINE

## 2020-12-17 PROCEDURE — 71260 CT THORAX DX C+: CPT

## 2020-12-17 PROCEDURE — 85610 PROTHROMBIN TIME: CPT | Performed by: EMERGENCY MEDICINE

## 2020-12-17 RX ORDER — ATORVASTATIN CALCIUM 10 MG/1
10 TABLET, FILM COATED ORAL
Status: ON HOLD | COMMUNITY
End: 2020-12-21

## 2020-12-17 RX ORDER — ONDANSETRON 2 MG/ML
4 INJECTION INTRAMUSCULAR; INTRAVENOUS EVERY 30 MIN PRN
Status: DISCONTINUED | OUTPATIENT
Start: 2020-12-17 | End: 2020-12-18 | Stop reason: HOSPADM

## 2020-12-17 RX ORDER — GADOBUTROL 604.72 MG/ML
10 INJECTION INTRAVENOUS ONCE
Status: DISCONTINUED | OUTPATIENT
Start: 2020-12-17 | End: 2020-12-18 | Stop reason: HOSPADM

## 2020-12-17 RX ORDER — ASPIRIN 325 MG
325 TABLET ORAL ONCE
Status: COMPLETED | OUTPATIENT
Start: 2020-12-17 | End: 2020-12-17

## 2020-12-17 RX ORDER — MECLIZINE HYDROCHLORIDE 25 MG/1
25 TABLET ORAL ONCE
Status: COMPLETED | OUTPATIENT
Start: 2020-12-17 | End: 2020-12-17

## 2020-12-17 RX ORDER — OLOPATADINE HYDROCHLORIDE 2 MG/ML
1 SOLUTION/ DROPS OPHTHALMIC DAILY PRN
COMMUNITY

## 2020-12-17 RX ORDER — IOPAMIDOL 755 MG/ML
500 INJECTION, SOLUTION INTRAVASCULAR ONCE
Status: COMPLETED | OUTPATIENT
Start: 2020-12-17 | End: 2020-12-17

## 2020-12-17 RX ADMIN — MECLIZINE HYDROCHLORIDE 25 MG: 25 TABLET ORAL at 17:41

## 2020-12-17 RX ADMIN — IOPAMIDOL 80 ML: 755 INJECTION, SOLUTION INTRAVENOUS at 18:30

## 2020-12-17 RX ADMIN — SODIUM CHLORIDE 1000 ML: 9 INJECTION, SOLUTION INTRAVENOUS at 17:41

## 2020-12-17 RX ADMIN — ASPIRIN 325 MG ORAL TABLET 325 MG: 325 PILL ORAL at 17:41

## 2020-12-17 RX ADMIN — SODIUM CHLORIDE 60 ML: 9 INJECTION, SOLUTION INTRAVENOUS at 18:31

## 2020-12-17 RX ADMIN — ONDANSETRON 4 MG: 2 INJECTION INTRAMUSCULAR; INTRAVENOUS at 17:41

## 2020-12-17 ASSESSMENT — ENCOUNTER SYMPTOMS
ABDOMINAL PAIN: 0
FEVER: 0
NUMBNESS: 0
VOMITING: 1
DIZZINESS: 1
BACK PAIN: 0
NAUSEA: 1
WEAKNESS: 0
CHILLS: 0
SHORTNESS OF BREATH: 0

## 2020-12-17 NOTE — ED TRIAGE NOTES
Patient brought in by EMS. Developed N/V/weakness today per daughter. Daughter concerned for stroke. Patient with hx of stroke in 90s. Vitals stable per EMS. . Triage for EMS limited d/t language barrier.     Zofran 4mg administered in rig.       Patient stated via  that around 1000 she developed vomiting/dizziness/chest pain.     No deficits noted by EMS or RN. Weakness on left side from previous stroke.

## 2020-12-17 NOTE — ED PROVIDER NOTES
History   Chief Complaint:  Nausea, Vomiting, Dizziness     A  was used.      Toshia Vela is a 75 year old female with a history of a paralytic stroke who presents via EMS with nausea, vomiting, dizziness, and chest pain. Per the patient, all of these symptoms started about 7 hours ago. The dizziness started first, described as a room-spinning feeling. Her daughter was concerned for a stroke and called EMS. EMS gave her 4 mg of Zofran and noted her blood sugar was 119. She states she had similar dizziness when she was diagnosed with hypertension. She denies headache, abdominal pain, back pain, weakness, numbness, fever, chills, and shortness of breath.    Review of Systems   Constitutional: Negative for chills and fever.   Respiratory: Negative for shortness of breath.    Cardiovascular: Positive for chest pain.   Gastrointestinal: Positive for nausea and vomiting. Negative for abdominal pain.   Musculoskeletal: Negative for back pain.   Neurological: Positive for dizziness. Negative for weakness and numbness.   All other systems reviewed and are negative.    Allergies:  The patient has no known allergies.     Medications:  Aggrenox  [place]  Prilosec  Lipitor    Past Medical History:    Chronic headaches  Hyperlipidemia  Hypertension  Mild intermittent asthma  Osteopenia  Stroke, paralytic  GERD  Legally blind in right eye  Memory loss    Past Surgical History:    Enucleation - left eye    Social History:  Tobacco Use: Never  Alcohol Use: Negative  Drug Use: Negative      Physical Exam     Patient Vitals for the past 24 hrs:   BP Pulse Resp SpO2   12/17/20 2200 132/49 68 -- --   12/17/20 2130 136/56 68 -- --   12/17/20 2115 (!) 153/57 -- -- --   12/17/20 2100 (!) 172/82 73 22 --   12/17/20 1930 (!) 168/90 78 -- 100 %   12/17/20 1910 -- -- -- 100 %   12/17/20 1905 -- -- -- 100 %   12/17/20 1900 -- -- -- 96 %   12/17/20 1845 -- 76 -- 100 %   12/17/20 1800 (!) 144/71 74 -- --   12/17/20 1745 (!)  149/83 75 -- 100 %   20 -- -- -- 98 %   20 170 (!) 167 74 18 98 %   20 170 (!)  70 -- 99 %       Physical Exam  General: Patient is awake, alert and interactive when I enter the room  Head: The scalp, face, and head appear normal  Eyes: left eye deformity, left eye blindness, no nystagmus in the right eye  ENT: External acoustic canals are normal. The oropharynx is normal without erythema. Uvula is in the midline  Neck: Normal range of motion. No anterior cervical lymphadenopathy noted  CV: Regular rate. S1/S2. No murmurs.   Resp: Lungs are clear without wheezes or rales. No respiratory distress.   GI: Abdomen is soft, no rigidity, guarding, or rebound. No distension. No tenderness to palpation in any quadrant.     MS: Normal tone. Joints grossly normal without effusions. No asymmetric leg swelling, calf or thigh tenderness.    Skin: No rash or lesions noted. Normal capillary refill noted  Neuro:   Speech is normal and fluent.   Face is symmetric without droop. CN's II-XII intact. Negative pronator drift. Finger to nose intact. Heel to shin intact.   Right Arm: Good  strength. 5/5 elbow flexion. 5/5 elbow extension. Sensation intact to light touch.   Left Arm:  slightly less than the right. 4/5 elbow flexion. 4/5 elbow extension. Sensation intact to light touch. She states that this is baseline for her   Right Le/5 straight leg raise, 5/5 knee flexion, 5/5 knee extension, 5/5 dorsiflexion, 5/5 plantar flexion. Sensation intact to light touch.   Left Le/5 straight leg raise, 5/5 knee flexion, 5/5 knee extension, 5/5 dorsiflexion, 5/5 plantar flexion. Sensation intact to light touch.    Romberg and gait: normal after meclizine   Psych:  Normal affect.  Appropriate interactions.    Emergency Department Course     ECG:  ECG taken at 1732, ECG read at 1739  Normal sinus rhythm  Nonspecific T wave abnormality  Abnormal ECG  No significant change from ECG dated 3/7/14.    Rate 67 bpm. [place] interval 190. QRS duration 86. QT/QTc 390/412. P-R-T axes 42 18 -11.    Imaging:    CT Aortic Survey w Contrast  1.  Thoracic and abdominal aorta are unremarkable.  2.  The lungs are clear. No evidence for acute pulmonary disease.  3.  Esophageal hiatal hernia.  4.  Fatty liver.  Reading per radiology.    MRA Brain ([place]) wo Contrast  HEAD MRA:   1.  Stenoses noted in the left MCA bifurcation, detailed above. No evidence of large vessel occlusion.  2.  Moderately severe focal stenosis P2 segment of the right posterior cerebral artery.  NECK MRA:  1.  Dominant right vertebral artery with small caliber left vertebral that affectively ends in the left posterior inferior cerebellar artery.  Reading per radiology.    MRA Angiogram Neck w/o Contrast  HEAD MRA:   1.  Stenoses noted in the left MCA bifurcation, detailed above. No evidence of large vessel occlusion.  2.  Moderately severe focal stenosis P2 segment of the right posterior cerebral artery.  NECK MRA:  1.  Dominant right vertebral artery with small caliber left vertebral that affectively ends in the left posterior inferior cerebellar artery.  Reading per radiology.    MR Brain w/o Contrast  1.  Focal, acute/subacute nonhemorrhagic infarct right parietal lobe near the vertex. There may be mild associated gyral swelling but without other mass effect. No other mass effect.  2.  Numerous supratentorial and infratentorial infarcts as detailed above.  3.  Atrophy and chronic small vessel vascular changes.  4.  Linear-type infarct also noted in the right paramedian midbrain.  Reading per radiology.    The above imaging workup was performed.     Laboratory:    CBC: WBC 5.1, HGB 13.2,   INR: 1.00  Troponin (Collected 1730): <0.015  BMP: Potassium 3.2(L), Glucose 117(H) o/w WNL (Creatinine: 0.56)    Glucose by Meter: 113(H)    Asymptomatic COVID-19 and Influenza by PCR Swab: In Process    Emergency Department Course:    Reviewed:  I  reviewed the patient's nursing notes, vitals, past medical records, Care Everywhere.     Assessments:  1705 I performed a physical exam of the patient as documented above.    Consults:   2124 I spoke with Dr. Mccloud of the stroke neurology service from [facility] regarding patient's presentation, findings, and plan of care.    Calls:  2129 I spoke to the patient's daughter regarding the patient's status and plan of care.  2141 I spoke to the patient's daughter again.  0005 Spoke with Daughter again regarding transfer to [facility], she is amenable     Interventions:  1741: Normal Saline, 1 liter, IV bolus   1741: Zofran, 4 mg, IV  1741: Antivert, 25 mg, Oral  1741: Aspirin, 325 mg, Oral    Disposition:  The patient was transferred to [facility].       Impression & Plan     CMS Diagnoses: The patient has stroke symptoms:         ED Stroke specific documentation           NIHSS PDF     Patient last known well time: 1000 am this morning   ED Provider first to bedside at: 1705    tPA:   Not given due to unclear or unfavorable risk-benefit profile for extended window thrombolysis beyond the conventional 4.5 hour time window.    If treating with tPA: Ensure SBP<185 and DBP<105 prior to treatment with IV tPA.  Administering IV tPA after treatment with IV labetalol, hydralazine, or nicardipine is reasonable once BP control is established.    Endovascular Retrieval:  Not initiated due to absence of proximal vessel occlusion    National Institutes of Health Stroke Scale (Baseline)  Time Performed: 1705     Score    Level of consciousness: (0)   Alert, keenly responsive    LOC questions: (0)   Answers both questions correctly    LOC commands: (0)   Performs both tasks correctly    Best gaze: (0)   Normal    Visual: (0)   No visual loss, baseline blind in left eye     Facial palsy: (0)   Normal symmetrical movements    Motor arm (left): (0)   No drift    Motor arm (right): (0)   No drift    Motor leg (left): (0)   No drift     Motor leg (right): (0)   No drift    Limb ataxia: (0)   Absent    Sensory: (0)   Normal- no sensory loss    Best language: (0)   Normal- no aphasia    Dysarthria: (0)   Normal    Extinction and inattention: (0)   No abnormality        Total Score:  0        Stroke Mimics were considered (including migraine headache, seizure disorder, hypoglycemia (or hyperglycemia), head or spinal trauma, CNS infection, Toxin ingestion and shock state (e.g. sepsis) .      Medical Decision Making:  Patient is a 75-year-old female with history of CVA, asthma, left eye blindness and osteoarthritis who presents emergency department today with significant dizziness, nausea, vomiting and chest pain.  Upon initial evaluation she is hemodynamically stable with normal vital signs.  She is afebrile.  Physical exam as detailed above highlighted by baseline left-sided hemiparesis however on my initial exam she was unable to stand up in bed secondary to significant vertiginous symptoms.  She had intact peripheral pulses but was complaining of significant chest pain.  Given her chest pain plus neurological symptoms of significant vertigo I was concerned about the possibility of aortic pathology.  Therefore a CT of the aorta was obtained.  Thankfully this was negative for any evidence of dissection or aneurysm.  Given that the patient was outside the window and I was concerned about the possibility of a posterior infarction we then proceeded to a MRI/MRA of the brain and neck.  Unfortunately this shows multiple areas of acute/subacute infarctions along with significant stenosis of the left MCA and right posterior cerebral artery.  I discussed the case with stroke neuro who did not recommend any acute interventions including TPA given that she was outside the window.  They recommended starting aspirin and admitting her for further stroke risk stratification.  Patient symptoms significantly improved while in the emergency department given the  aforementioned interventions.  She was able to stand up and walk to the bathroom on multiple occasions.  I was able to reevaluate her and her gait and Romberg are normal.  Unfortunately there are no beds available here at [facility] therefore she will be transferred to [facility].  Discussed the results and plan with the patient's daughter who is amenable at this time.  I also consider the possibility of cardiac equivalent.  EKG did not show any signs of ischemia, atrial fibrillation or additional dysrhythmia.  Initial troponin is negative here.  Patient remained hemodynamically stable while under my care and is currently awaiting transportation to [facility].    Covid-19  Toshia Vela was evaluated during a global COVID-19 pandemic, which necessitated consideration that the patient might be at risk for infection with the SARS-CoV-2 virus that causes COVID-19.   Applicable protocols for evaluation were followed during the patient's care.   COVID-19 was considered as part of the patient's evaluation. The plan for testing is:  a test was obtained during this visit.    Diagnosis:    ICD-10-CM    1. Cerebrovascular accident (CVA), unspecified mechanism (H)  I63.9 Asymptomatic Influenza A/B & SARS-CoV2 (COVID-19) Virus PCR Multiplex   2. Chest pain, unspecified type  R07.9        Scribe Disclosure:  I, Danilo Abebe, am serving as a scribe at 5:07 PM on 12/17/2020 to document services personally performed by Stephan Oh MD based on my observations and the provider's statements to me.          Stephan Oh MD  12/18/20 0051       Stephan Oh MD  12/18/20 0052

## 2020-12-17 NOTE — ED NOTES
Bed: ED19  Expected date: 12/17/20  Expected time: 4:50 PM  Means of arrival: Ambulance  Comments:  ROMANA  74yo F n/v

## 2020-12-18 ENCOUNTER — APPOINTMENT (OUTPATIENT)
Dept: SPEECH THERAPY | Facility: CLINIC | Age: 75
DRG: 065 | End: 2020-12-18
Attending: INTERNAL MEDICINE
Payer: COMMERCIAL

## 2020-12-18 ENCOUNTER — APPOINTMENT (OUTPATIENT)
Dept: CARDIOLOGY | Facility: CLINIC | Age: 75
DRG: 065 | End: 2020-12-18
Attending: PHYSICIAN ASSISTANT
Payer: COMMERCIAL

## 2020-12-18 ENCOUNTER — APPOINTMENT (OUTPATIENT)
Dept: PHYSICAL THERAPY | Facility: CLINIC | Age: 75
DRG: 065 | End: 2020-12-18
Attending: PHYSICIAN ASSISTANT
Payer: COMMERCIAL

## 2020-12-18 ENCOUNTER — HOSPITAL ENCOUNTER (INPATIENT)
Facility: CLINIC | Age: 75
LOS: 3 days | Discharge: HOME HEALTH | DRG: 065 | End: 2020-12-21
Attending: INTERNAL MEDICINE | Admitting: STUDENT IN AN ORGANIZED HEALTH CARE EDUCATION/TRAINING PROGRAM
Payer: COMMERCIAL

## 2020-12-18 VITALS
OXYGEN SATURATION: 100 % | DIASTOLIC BLOOD PRESSURE: 78 MMHG | HEART RATE: 63 BPM | SYSTOLIC BLOOD PRESSURE: 147 MMHG | RESPIRATION RATE: 13 BRPM

## 2020-12-18 DIAGNOSIS — I63.513: Primary | ICD-10-CM

## 2020-12-18 PROBLEM — I63.9 CVA (CEREBRAL VASCULAR ACCIDENT) (H): Status: ACTIVE | Noted: 2020-12-18

## 2020-12-18 LAB
FLUAV+FLUBV RNA SPEC QL NAA+PROBE: NEGATIVE
FLUAV+FLUBV RNA SPEC QL NAA+PROBE: NEGATIVE
GLUCOSE BLDC GLUCOMTR-MCNC: 129 MG/DL (ref 70–99)
GLUCOSE BLDC GLUCOMTR-MCNC: 75 MG/DL (ref 70–99)
GLUCOSE BLDC GLUCOMTR-MCNC: 81 MG/DL (ref 70–99)
INTERPRETATION ECG - MUSE: NORMAL
LABORATORY COMMENT REPORT: NORMAL
POTASSIUM SERPL-SCNC: 3.4 MMOL/L (ref 3.4–5.3)
RSV RNA SPEC QL NAA+PROBE: NEGATIVE
SARS-COV-2 RNA SPEC QL NAA+PROBE: NEGATIVE
SPECIMEN SOURCE: NORMAL
TROPONIN I SERPL-MCNC: <0.015 UG/L (ref 0–0.04)

## 2020-12-18 PROCEDURE — 99223 1ST HOSP IP/OBS HIGH 75: CPT | Mod: AI | Performed by: PHYSICIAN ASSISTANT

## 2020-12-18 PROCEDURE — 93306 TTE W/DOPPLER COMPLETE: CPT | Mod: 26 | Performed by: INTERNAL MEDICINE

## 2020-12-18 PROCEDURE — 92610 EVALUATE SWALLOWING FUNCTION: CPT | Mod: GN | Performed by: SPEECH-LANGUAGE PATHOLOGIST

## 2020-12-18 PROCEDURE — 93005 ELECTROCARDIOGRAM TRACING: CPT

## 2020-12-18 PROCEDURE — 84132 ASSAY OF SERUM POTASSIUM: CPT | Performed by: HOSPITALIST

## 2020-12-18 PROCEDURE — 999N000208 ECHOCARDIOGRAM COMPLETE

## 2020-12-18 PROCEDURE — 999N001017 HC STATISTIC GLUCOSE BY METER IP

## 2020-12-18 PROCEDURE — 250N000013 HC RX MED GY IP 250 OP 250 PS 637: Performed by: PHYSICIAN ASSISTANT

## 2020-12-18 PROCEDURE — 120N000001 HC R&B MED SURG/OB

## 2020-12-18 PROCEDURE — 97161 PT EVAL LOW COMPLEX 20 MIN: CPT | Mod: GP | Performed by: PHYSICAL THERAPIST

## 2020-12-18 PROCEDURE — 258N000003 HC RX IP 258 OP 636: Performed by: PHYSICIAN ASSISTANT

## 2020-12-18 PROCEDURE — 250N000009 HC RX 250: Performed by: PHYSICIAN ASSISTANT

## 2020-12-18 PROCEDURE — 99222 1ST HOSP IP/OBS MODERATE 55: CPT | Mod: GC | Performed by: PSYCHIATRY & NEUROLOGY

## 2020-12-18 PROCEDURE — 99232 SBSQ HOSP IP/OBS MODERATE 35: CPT | Performed by: NURSE PRACTITIONER

## 2020-12-18 PROCEDURE — 255N000002 HC RX 255 OP 636: Performed by: INTERNAL MEDICINE

## 2020-12-18 PROCEDURE — 93010 ELECTROCARDIOGRAM REPORT: CPT | Performed by: INTERNAL MEDICINE

## 2020-12-18 PROCEDURE — 97530 THERAPEUTIC ACTIVITIES: CPT | Mod: GP | Performed by: PHYSICAL THERAPIST

## 2020-12-18 PROCEDURE — 84484 ASSAY OF TROPONIN QUANT: CPT | Performed by: HOSPITALIST

## 2020-12-18 PROCEDURE — 36415 COLL VENOUS BLD VENIPUNCTURE: CPT | Performed by: HOSPITALIST

## 2020-12-18 PROCEDURE — 250N000013 HC RX MED GY IP 250 OP 250 PS 637: Performed by: HOSPITALIST

## 2020-12-18 PROCEDURE — 97116 GAIT TRAINING THERAPY: CPT | Mod: GP | Performed by: PHYSICAL THERAPIST

## 2020-12-18 RX ORDER — SODIUM CHLORIDE 9 MG/ML
INJECTION, SOLUTION INTRAVENOUS CONTINUOUS
Status: ACTIVE | OUTPATIENT
Start: 2020-12-18 | End: 2020-12-18

## 2020-12-18 RX ORDER — ONDANSETRON 4 MG/1
4 TABLET, ORALLY DISINTEGRATING ORAL EVERY 6 HOURS PRN
Status: DISCONTINUED | OUTPATIENT
Start: 2020-12-18 | End: 2020-12-21 | Stop reason: HOSPADM

## 2020-12-18 RX ORDER — ASPIRIN AND DIPYRIDAMOLE 25; 200 MG/1; MG/1
1 CAPSULE, EXTENDED RELEASE ORAL 2 TIMES DAILY
Status: DISCONTINUED | OUTPATIENT
Start: 2020-12-18 | End: 2020-12-19

## 2020-12-18 RX ORDER — ATORVASTATIN CALCIUM 10 MG/1
10 TABLET, FILM COATED ORAL
Status: DISCONTINUED | OUTPATIENT
Start: 2020-12-19 | End: 2020-12-19

## 2020-12-18 RX ORDER — LABETALOL HYDROCHLORIDE 5 MG/ML
10-40 INJECTION, SOLUTION INTRAVENOUS EVERY 10 MIN PRN
Status: DISCONTINUED | OUTPATIENT
Start: 2020-12-18 | End: 2020-12-21 | Stop reason: HOSPADM

## 2020-12-18 RX ORDER — HYDRALAZINE HYDROCHLORIDE 20 MG/ML
10-20 INJECTION INTRAMUSCULAR; INTRAVENOUS
Status: DISCONTINUED | OUTPATIENT
Start: 2020-12-18 | End: 2020-12-21 | Stop reason: HOSPADM

## 2020-12-18 RX ORDER — VITAMIN B COMPLEX
1000 TABLET ORAL DAILY
Status: DISCONTINUED | OUTPATIENT
Start: 2020-12-18 | End: 2020-12-21 | Stop reason: HOSPADM

## 2020-12-18 RX ORDER — LIDOCAINE 40 MG/G
CREAM TOPICAL
Status: DISCONTINUED | OUTPATIENT
Start: 2020-12-18 | End: 2020-12-21 | Stop reason: HOSPADM

## 2020-12-18 RX ORDER — PROCHLORPERAZINE 25 MG
12.5 SUPPOSITORY, RECTAL RECTAL EVERY 12 HOURS PRN
Status: DISCONTINUED | OUTPATIENT
Start: 2020-12-18 | End: 2020-12-21 | Stop reason: HOSPADM

## 2020-12-18 RX ORDER — ERYTHROMYCIN 5 MG/G
OINTMENT OPHTHALMIC AT BEDTIME
Status: DISCONTINUED | OUTPATIENT
Start: 2020-12-18 | End: 2020-12-21 | Stop reason: HOSPADM

## 2020-12-18 RX ORDER — ACETAMINOPHEN 325 MG/1
650 TABLET ORAL EVERY 4 HOURS PRN
Status: DISCONTINUED | OUTPATIENT
Start: 2020-12-18 | End: 2020-12-21 | Stop reason: HOSPADM

## 2020-12-18 RX ORDER — POTASSIUM CHLORIDE 1500 MG/1
40 TABLET, EXTENDED RELEASE ORAL ONCE
Status: COMPLETED | OUTPATIENT
Start: 2020-12-18 | End: 2020-12-18

## 2020-12-18 RX ORDER — CALCIUM CARBONATE 500 MG/1
1000 TABLET, CHEWABLE ORAL 4 TIMES DAILY PRN
Status: DISCONTINUED | OUTPATIENT
Start: 2020-12-18 | End: 2020-12-21 | Stop reason: HOSPADM

## 2020-12-18 RX ORDER — ASPIRIN 81 MG/1
81 TABLET ORAL DAILY
Status: DISCONTINUED | OUTPATIENT
Start: 2020-12-18 | End: 2020-12-18

## 2020-12-18 RX ORDER — ONDANSETRON 2 MG/ML
4 INJECTION INTRAMUSCULAR; INTRAVENOUS EVERY 6 HOURS PRN
Status: DISCONTINUED | OUTPATIENT
Start: 2020-12-18 | End: 2020-12-21 | Stop reason: HOSPADM

## 2020-12-18 RX ORDER — ACETAMINOPHEN 650 MG/1
650 SUPPOSITORY RECTAL EVERY 4 HOURS PRN
Status: DISCONTINUED | OUTPATIENT
Start: 2020-12-18 | End: 2020-12-21 | Stop reason: HOSPADM

## 2020-12-18 RX ORDER — PREDNISOLONE ACETATE 10 MG/ML
1 SUSPENSION/ DROPS OPHTHALMIC 2 TIMES DAILY
Status: DISCONTINUED | OUTPATIENT
Start: 2020-12-18 | End: 2020-12-21 | Stop reason: HOSPADM

## 2020-12-18 RX ORDER — OLOPATADINE HYDROCHLORIDE 2 MG/ML
1 SOLUTION/ DROPS OPHTHALMIC DAILY
Status: DISCONTINUED | OUTPATIENT
Start: 2020-12-18 | End: 2020-12-21 | Stop reason: HOSPADM

## 2020-12-18 RX ORDER — PROCHLORPERAZINE MALEATE 5 MG
5 TABLET ORAL EVERY 6 HOURS PRN
Status: DISCONTINUED | OUTPATIENT
Start: 2020-12-18 | End: 2020-12-21 | Stop reason: HOSPADM

## 2020-12-18 RX ADMIN — Medication 1000 UNITS: at 12:38

## 2020-12-18 RX ADMIN — CALCIUM CARBONATE (ANTACID) CHEW TAB 500 MG 1000 MG: 500 CHEW TAB at 21:22

## 2020-12-18 RX ADMIN — PREDNISOLONE ACETATE 1 DROP: 10 SUSPENSION/ DROPS OPHTHALMIC at 12:39

## 2020-12-18 RX ADMIN — ERYTHROMYCIN: 5 OINTMENT OPHTHALMIC at 21:23

## 2020-12-18 RX ADMIN — OLOPATADINE HYDROCHLORIDE 1 DROP: 2 SOLUTION/ DROPS OPHTHALMIC at 12:39

## 2020-12-18 RX ADMIN — POTASSIUM CHLORIDE 40 MEQ: 1500 TABLET, EXTENDED RELEASE ORAL at 22:36

## 2020-12-18 RX ADMIN — FLUTICASONE FUROATE AND VILANTEROL TRIFENATATE 1 PUFF: 200; 25 POWDER RESPIRATORY (INHALATION) at 12:39

## 2020-12-18 RX ADMIN — ASPIRIN AND EXTENDED-RELEASE DIPYRIDAMOLE 1 CAPSULE: 25; 200 CAPSULE ORAL at 22:36

## 2020-12-18 RX ADMIN — PREDNISOLONE ACETATE 1 DROP: 10 SUSPENSION/ DROPS OPHTHALMIC at 20:34

## 2020-12-18 RX ADMIN — HUMAN ALBUMIN MICROSPHERES AND PERFLUTREN 9 ML: 10; .22 INJECTION, SOLUTION INTRAVENOUS at 15:03

## 2020-12-18 RX ADMIN — SODIUM CHLORIDE: 9 INJECTION, SOLUTION INTRAVENOUS at 13:41

## 2020-12-18 RX ADMIN — ACETAMINOPHEN 650 MG: 325 TABLET, FILM COATED ORAL at 21:22

## 2020-12-18 RX ADMIN — ASPIRIN 81 MG: 81 TABLET, DELAYED RELEASE ORAL at 12:38

## 2020-12-18 ASSESSMENT — ACTIVITIES OF DAILY LIVING (ADL)
ADLS_ACUITY_SCORE: 19
ADLS_ACUITY_SCORE: 17
ADLS_ACUITY_SCORE: 16

## 2020-12-18 NOTE — ED NOTES
Daughter Diamond updated via phone. Diamond upset and doesn't understand why she cannot come back and say Hi to Mother and then leave. RN educated Diamond of policy. Diamond upset but agreeable to plan.

## 2020-12-18 NOTE — ED NOTES
Pt. Was ambulated to the bathroom, tolerated well and was continent. Provider is at bedside explaining plan.

## 2020-12-18 NOTE — PHARMACY-ADMISSION MEDICATION HISTORY
"Admission medication history interview status for this patient is complete. See [facility] admission navigator for allergy information, prior to admission medications and immunization status.     Medication history interview done via telephone during Covid-19 pandemic, indicate source(s): Patient's daughter  Medication history resources (including written lists, pill bottles, clinic record):None  Pharmacy:  [facility] [place]    Changes made to PTA medication list:  Added: omeprazole, olopatadine  Deleted: \"medication for hypertension - unknown to patient\"  Changed: atorvastatin to 3x/week    Actions taken by pharmacist (provider contacted, etc): Spoke with patient's daughter     Additional medication history information: The patient's daughter reported that the patient takes atorvastatin 3 times a week and took it this past week once or twice. The patient had a medication listed \"medication for hypertension - unknown to patient;\" in speaking with the patient's daughter, she confirmed the patient takes aggrenox for her heart health for the past 20 years, and this is the \"hypertension\" med. The patient doesn't take the advair daily, but asks for it when she needs it - her daughter wanted to confirm that she would have access to it during her stay.    The 2 phone numbers on the patient's file are old, and her son's phone number is [phone]. The daughter-in-law's phone number is [phone] and you should use that number to speak with the family. The mobile number listed for the patient is old, and by chance, the son's home number was working (it normally isn't in use/the ringer isn't on). The patient's daughter-in-law wanted to ensure that we are taking good care of her and that she will be moved to the ICU for monitoring.     Medication reconciliation/reorder completed by provider prior to medication history?  N   (Y/N)      Prior to Admission medications    Medication Sig Last Dose Taking? Auth Provider "   aspirin-dipyridamole (AGGRENOX)  MG per 12 hr capsule Take 1 capsule by mouth 2 times daily 12/17/2020 at 1x Yes China Park MD   atorvastatin (LIPITOR) 10 MG tablet Take 10 mg by mouth three times a week Past Week at Unknown time Yes Unknown, Entered By History   calcium carb 1250 mg, 500 mg [place],/vitamin D 200 units (OSCAL WITH D) 500-200 MG-UNIT per tablet Take 1 tablet by mouth 2 times daily (with meals) 12/17/2020 at 1x Yes China Park MD   cholecalciferol (VITAMIN D) 1000 UNIT tablet Take 1 tablet by mouth daily. 12/17/2020 at am Yes China Park MD   Emollient (CETAPHIL) CREA Use it daily to the dry skin 12/15/2020 Yes China Park MD   erythromycin (ROMYCIN) ophthalmic ointment Place  into both eyes At Bedtime. 12/16/2020 at pm Yes China Park MD   fluticasone-salmeterol (ADVAIR) 250-50 MCG/DOSE diskus inhaler Inhale 1 puff into the lungs 2 times daily Need to make appointment Past Month at Unknown time Yes China Park MD   omeprazole (PRILOSEC) 20 MG DR capsule Take 20 mg by mouth daily as needed 12/15/2020 Yes Unknown, Entered By History   PRED FORTE 1 % ophthalmic suspension Place 1 drop into the right eye 2 times daily 12/17/2020 at 1x Yes Reported, Patient   olopatadine (PATADAY) 0.2 % ophthalmic solution Place 1 drop into both eyes daily Unknown at Unknown time  Unknown, Entered By History   triamcinolone (KENALOG) 0.1 % cream Apply sparingly to affected area twice a day Unknown at Unknown time  China Park MD

## 2020-12-18 NOTE — ED NOTES
Triage/medications/assessment/educating provided by RN and MD via  Ipad. Patient A&Ox4. Appropriately answering questions. Does not have any additional questions about plan of care at this time. Vitals stable. Patient agreeable to plan.

## 2020-12-18 NOTE — PLAN OF CARE
"PT: Smoking cessation consult received; patient has \"never smoked\" per chart. Order completed.  " I saw Mrs. Mckayla Mcdaniel on late morning rounds. Her daughter was at bedside. The nurses report they are administering when necessary morphine less on their own evaluation of the patient's distress than on the request of family at bedside. The patient's eyes are closed she was given morphine approximately 2 hours prior to my visit. Respiration is unlabored at 22/m. She is afebrile. 2+ ankle edema and 4+ manual edema has essentially resolved over the last week. Urine output continues to be in excess of 500 ML's per day of relatively clear yellow urine. Patient was massively over hydrated prior to transfer to the hospice house. She is surviving off of these stored fluids. She has had nothing to eat or drink for approximately 10 days but remains hemodynamically stable with a decent renal output. I do not think we need to increase fentanyl from present 125 µg per hour transdermal infusion. Subcutaneous morphine continues to be required several times per day. The patient's life expectancy at this point is less than 5 days.   Shawnie Sicard M.D.

## 2020-12-18 NOTE — PROGRESS NOTES
12/18/20 1632   Quick Adds   Type of Visit Initial PT Evaluation       Present yes   Language [place]  (Ipad)   Living Environment   People in home child(germain), adult  (lives with daughter)   Current Living Arrangements apartment  (1st floor)   Home Accessibility no concerns   Living Environment Comments reports living with daughter who makes meals, assists with bathing   Self-Care   Usual Activity Tolerance moderate   Current Activity Tolerance fair   Equipment Currently Used at Home none   Activity/Exercise/Self-Care Comment reports via  that she has L arm > leg weakness at baseline, uses no assistive device to walker; daughter if her PCA   Disability/Function   Fall history within last six months no   General Information   Onset of Illness/Injury or Date of Surgery 12/18/20   Referring Physician Rosendo Orozco PA   Patient/Family Therapy Goals Statement (PT) return home with daughter's assist   Pertinent History of Current Problem (include personal factors and/or comorbidities that impact the POC) per chart: 75 year old female with a past medical history of CVA with residual left hemiparesis, asthma, hypertension, hyperlipidemia, chronic headaches, and GERD who presented to [facility] ED with nausea, vomiting, and dizziness.  A code stroke was initiated in the emergency department, and patient was found to have evidence of acute CVA.  MRI showed 1.  Focal, acute/subacute nonhemorrhagic infarct right parietal lobe near the vertex. There may be mild associated gyral swelling but without other mass effect. No other mass effect. 2.  Numerous supratentorial and infratentorial infarcts as detailed above; see medical record for further information   Existing Precautions/Restrictions fall   General Observations patient impulsive at times during session   Cognition   Orientation Status (Cognition) person  (unsure if oriented to place and time as patient not  answerin)   Safety Deficit (Cognition) impulsivity;insight into deficits/self-awareness   Memory Deficit (Cognition) other (see comments)  (defer to OT for further testing as needed)   Pain Assessment   Patient Currently in Pain No   Posture    Posture Comments forward flexed at trunk in standing and sitting at EOB   Range of Motion (ROM)   ROM Comment appears intact on L in shoulder and elbow but decreased in L hand; others appear WFL   Strength   Strength Comments residual L sided weakness from prior stroke; appears to have some functional weakness during mobility   Bed Mobility   Comment (Bed Mobility) SBA and use of bedrail   Transfers   Transfer Safety Comments sit<>stand with min A of 1; use of walker   Gait/Stairs (Locomotion)   Comment (Gait/Stairs) ambulated to bathroom with walker and min A and safety cues   Balance   Balance Comments impaired; current need for assistive device and assist; no gross LOB; intact sitting balance at EOB   Muscle Tone   Muscle Tone Comments appears intact; no increased tone noted   Clinical Impression   Criteria for Skilled Therapeutic Intervention yes, treatment indicated   PT Diagnosis (PT) impaired gait/transfers   Influenced by the following impairments impaired balance; mildly impulsive; functional weakness   Functional limitations due to impairments prior L sided weakness and new infarcts   Clinical Presentation Evolving/Changing   Clinical Presentation Rationale clinical judgement; level of assist   Clinical Decision Making (Complexity) low complexity   Therapy Frequency (PT) Daily   Predicted Duration of Therapy Intervention (days/wks) 4 days   Planned Therapy Interventions (PT) bed mobility training;gait training;transfer training   Anticipated Equipment Needs at Discharge (PT) cane, straight;cane, quad   Risk & Benefits of therapy have been explained evaluation/treatment results reviewed;care plan/treatment goals reviewed;patient;risks/benefits reviewed;participants  included   Clinical Impression Comments patient appears below reported baseline level of function   PT Discharge Planning    PT Discharge Recommendation (DC Rec) home with assist;home with home care physical therapy   PT Rationale for DC Rec patient appears below reported baseline level of function and would benefit from ongoing PT to address current deficits in hospital and at home; will have assist of daughter per her report; would need 24/7 assist currently;    PT Brief overview of current status  falls risk; SBA for bed mobility; min A for transfers/gait   Total Evaluation Time   Total Evaluation Time (Minutes) 15

## 2020-12-18 NOTE — ED NOTES
Updated patient via  ipad. COVID swab completed. Patient with no complaints and agreeable to plan. No questions for RN at this time. Vitals stable. Provided patient with warm blanket.

## 2020-12-18 NOTE — ED NOTES
"Writer called daughter Diamond again to ask about Eye transplant patient reported during MRI checklist. Diamond did not let writer speak. Diamond very upset about \"the care she is getting\". \"This is unacceptable. I need to come back. Someone better meet me out at the front door\". Stating patient was incontinent of urine. Patient recently toileted by ERT and patient continent of urine.  Writer was unable to ask about Eye Transplant because Diamond did not stop yelling on phone. RN stated she would have charge RN call back. Charge updated.   "

## 2020-12-18 NOTE — ED NOTES
"Patient settled after MRI.  iPad at bedside. Patient stating she feels \"better\". Assisted patient to BR. Patient ambulated with SBA. Tolerated ambulation. Voided without difficulty. Provided pt with warm blanket.   "

## 2020-12-18 NOTE — PROGRESS NOTES
12/18/20 1206   General Information   Onset of Illness/Injury or Date of Surgery 12/18/20   Referring Physician Dr. Meneses   Patient/Family Therapy Goal Statement (SLP) eat and drink asap   Pertinent History of Current Problem Hx of CVA with left sided deficits; no reported or documented dysphagia   General Observations Pt alert. Pt and  denied any language or cognitive deficits or changes   Past History of Dysphagia none       Present yes   Language [place]   Type of Evaluation   Type of Evaluation Swallow Evaluation   Oral Motor   Oral Musculature anomalies present   Facial Symmetry (Oral Motor)   Left Side Facial Asymmetry minimal impairment  (left droop at baseline)   General Swallowing Observations   Current Diet/Method of Nutritional Intake (General Swallowing Observations, NIS) NPO   Respiratory Support (General Swallowing Observations) none   Swallowing Evaluation Clinical swallow evaluation   Swallowing Recommendations   Diet Consistency Recommendations regular diet;thin liquids   Supervision Level for Intake patient independent   Mode of Delivery Recommendations bolus size, small   Recommended Feeding/Eating Techniques (Swallow Eval) patient is independent, no specific recommendations   Comment, Swallowing Recommendations SLP: Pt presents with no appreciable dysphagia on clinical swallow evaluation. Baseline, mild left facial droop. Pt impulsive with gulping thin liquids by cup and straw and eating crackers. No oral deficits, no overt s/sx of aspiration and no oral residue thorughout. Pt in agreement with regular diet (no pork). Pt and  denied any speech/language/cognitive deficits.    SLP Therapy Assessment/Plan   Criteria for Skilled Therapeutic Interventions Met (SLP Eval) does not meet criteria for skilled intervention   Therapy Plan Review/Discharge Plan (SLP)   Therapy Plan Review (SLP) evaluation/treatment results reviewed;patient   SLP Discharge Planning     SLP Discharge Recommendation (DC Rec) home   SLP Rationale for DC Rec No further SLP services indicated at this time. Could consider full language and cognitive evaluation in the OP setting, however, pt and  denying any issues at this time   SLP Brief overview of current status  Regular diet and thin liquids with standard aspiration precautions    Total Evaluation Time   Total Evaluation Time (Minutes) 15

## 2020-12-18 NOTE — ED NOTES
MRI checklist completed via  ipad. No additional questions from patient. Patient provided socks that daughter dropped off at door.

## 2020-12-18 NOTE — CONSULTS
"  [facility]    Stroke Consult Note    Reason for Consult:  Recurrent Stroke     Chief Complaint: No chief complaint on file.       HPI  \"Toshia Vela is a 75 year old female with a past medical history of CVA with residual left hemiparesis, asthma, hypertension, hyperlipidemia, chronic headaches, and GERD who presented to [facility] ED with nausea, vomiting, and dizziness.  A code stroke was initiated in the emergency department, and patient was found to have evidence of acute CVA.  She was transferred to [facility] for further evaluation and monitoring.\"     Patient is non english speaking and all additional history was obtain from daughter via telephone. Daughter states that patient has been taking Aggrenox x 17/18 years now and has been taking statin (3 times a week) to help prevent her stroke. One of her doctor tried switching her aggrenox to plavix due to headaches, however, plavix made her sick to her stomach and she was switched back to Aggrenox.         Impression  #Ischemic Stroke due to large-artery atherosclerosis (embolus/thrombosis)   #Infarct right parietal lobe   MRA brain shows progressive worsening of intracranial atherosclerotic disease when compared to prior imaging.   - TTE with Bubble Study negative for PFO with EF of 60% and normal atrial sizes.     Called and spoke to daughter (Diamond) and went over the plan with her.     Recommendations  Acute Ischemic Stroke (without tPA) Recommendations  - Neurochecks Q 4 hours  - Continue home aggrenox for now, will consult pharmacy to assess cost of brillinta, if affordable, will switch to ASA 81 mg and Brilinta 90 mg BID, if cost is an issue, will switch to ASA 81 mg and Cilostazol   - Statin: Lipitor 40 mg   - Telemetry, EKG  - Bedside Glucose Monitoring  - A1c, Lipid Panel, Troponin x 3  - PT/OT/SLP  - Stroke Education  - Euthermia, Euglycemia    Will need cardiac monitor on discharge. " "    Patient Follow-up    - final recommendation pending work-up    Thank you for this consult. We will continue to follow.     The Stroke Staff is Dr. Sanchez.    Nasir Guillaume MD  Vascular Neurology Fellow  To page me or covering stroke neurology team member, click here: [email or web address]   Choose \"On Call\" tab at top, then search dropdown box for \"Neurology Adult\", select location, press Enter, then look for stroke/neuro ICU/telestroke.    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 Authorizing Provider   aspirin-dipyridamole (AGGRENOX)  MG per 12 hr capsule Take 1 capsule by mouth 2 times daily 12/19/13   China Park MD   atorvastatin (LIPITOR) 10 MG tablet Take 10 mg by mouth three times a week    Unknown, Entered By History   calcium carb 1250 mg, 500 mg [place],/vitamin D 200 units (OSCAL WITH D) 500-200 MG-UNIT per tablet Take 1 tablet by mouth 2 times daily (with meals) 12/26/13   China Park MD   cholecalciferol (VITAMIN D) 1000 UNIT tablet Take 1 tablet by mouth daily. 10/30/12   China Park MD   Emollient (CETAPHIL) CREA Use it daily to the dry skin 1/7/13   China Park MD   erythromycin (ROMYCIN) ophthalmic ointment Place  into both eyes At Bedtime. 1/10/12   China Park MD   fluticasone-salmeterol (ADVAIR) 250-50 MCG/DOSE diskus inhaler Inhale 1 puff into the lungs 2 " times daily Need to make appointment 12/26/13   China Park MD   olopatadine (PATADAY) 0.2 % ophthalmic solution Place 1 drop into both eyes daily    Unknown, Entered By History   omeprazole (PRILOSEC) 20 MG DR capsule Take 20 mg by mouth daily as needed    Unknown, Entered By History   PRED FORTE 1 % ophthalmic suspension Place 1 drop into the right eye 2 times daily 9/18/15   Reported, Patient   triamcinolone (KENALOG) 0.1 % cream Apply sparingly to affected area twice a day 1/7/13   China Park MD       Scheduled Meds    aspirin  81 mg Oral Daily     [START ON 12/19/2020] atorvastatin  10 mg Oral Once per day on Tue Thu Sat     erythromycin   Both Eyes At Bedtime     fluticasone-vilanterol  1 puff Inhalation Daily     olopatadine  1 drop Both Eyes Daily     prednisoLONE acetate  1 drop Right Eye BID     sodium chloride (PF)  3 mL Intracatheter Q8H     vitamin D3  1,000 Units Oral Daily       Infusion Meds    - MEDICATION INSTRUCTIONS -       - MEDICATION INSTRUCTIONS -       sodium chloride 100 mL/hr at 12/18/20 1341       PRN Meds  acetaminophen **OR** acetaminophen, calcium carbonate, hydrALAZINE, labetalol, lidocaine 4%, lidocaine (buffered or not buffered), - MEDICATION INSTRUCTIONS -, melatonin, ondansetron **OR** ondansetron, - MEDICATION INSTRUCTIONS -, prochlorperazine **OR** prochlorperazine **OR** prochlorperazine, sodium chloride (PF)    Allergies   No Known Allergies  Family History   Unable to obtain due to language barrier.  Social History   Social History     Tobacco Use     Smoking status: Never Smoker     Smokeless tobacco: Never Used   Substance Use Topics     Alcohol use: No     Drug use: No       Review of Systems   The 10 point Review of Systems is negative other than noted in the HPI or here.        PHYSICAL EXAMINATION   Temp:  [97.9  F (36.6  C)-98.5  F (36.9  C)] 98.1  F (36.7  C)  Pulse:  [62-78] 68  Resp:  [12-22] 16  BP: (118-172)/(49-90)  147/77  SpO2:  [96 %-100 %] 98 %    Neurologic  Mental Status:  follows commands, speech clear and fluent, naming and repetition normal  Cranial Nerves:  no dysarthria, shoulder shrug strong bilaterally, tongue protrusion midline, legally blind   Motor:  antigravity in all extremity with good resistance  Reflexes:  toes down-going  Sensory:  light touch sensation intact and symmetric throughout upper and lower extremities  Coordination:  normal finger-to-nose and no resting tremor  Station/Gait:  deferred    Dysphagia Screen  Per Nursing      Imaging  I personally reviewed all imaging; relevant findings per HPI.    Labs Data   CBC  Recent Labs   Lab 12/17/20  1730   WBC 5.1   RBC 4.24   HGB 13.2   HCT 40.2        Basic Metabolic Panel   Recent Labs   Lab 12/17/20  1730      POTASSIUM 3.2*   CHLORIDE 107   CO2 28   BUN 10   CR 0.56   *   ENEDIA 9.0     Liver Panel  No results for input(s): PROTTOTAL, ALBUMIN, BILITOTAL, ALKPHOS, AST, ALT, BILIDIRECT in the last 168 hours.  INR    Recent Labs   Lab Test 12/17/20  1730 03/07/14  1250   INR 1.00 1.11      Lipid Profile    Recent Labs   Lab Test 06/13/13  0906   CHOL 141   HDL 48*   LDL 82   TRIG 51   CHOLHDLRATIO 2.9     A1C  No lab results found.  Troponin I    Recent Labs   Lab 12/17/20  1730   TROPI <0.015          Stroke Code / Stroke Consult Data Data This was a non-emergent, non-tele stroke consult.

## 2020-12-18 NOTE — UTILIZATION REVIEW
Admission Status; Secondary Review Determination       Under the authority of the Utilization Management Committee, the utilization review process indicated a secondary review on the above patient. The review outcome is based on review of the medical records, discussions with staff, and applying clinical experience noted on the date of the review.     (x) Inpatient Status Appropriate - This patient's medical care is consistent with medical management for inpatient care and reasonable inpatient medical practice.     RATIONALE FOR DETERMINATION   75 year old female with a past medical history of CVA with residual left hemiparesis, asthma, hypertension, hyperlipidemia, chronic headaches, and GERD who presented to [facility] ED with nausea, vomiting, and dizziness.  A code stroke was initiated in the emergency department, and patient was found to have evidence of acute CVA.  MRI showed 1.  Focal, acute/subacute nonhemorrhagic infarct right parietal lobe near the vertex. There may be mild associated gyral swelling but without other mass effect. No other mass effect. 2.  Numerous supratentorial and infratentorial infarcts as detailed above.  The expected length of stay at the time of admission was more than 2 nights because of the severity of illness, intensity of service provided, and risk for adverse outcome. Inpatient admission is appropriate.     This document was produced using voice recognition software       The information on this document is developed by the utilization review team in order for the business office to ensure compliance. This only denotes the appropriateness of proper admission status and does not reflect the quality of care rendered.   The definitions of Inpatient Status and Observation Status used in making the determination above are those provided in the CMS Coverage Manual, Chapter 1 and Chapter 6, section 70.4.   Sincerely,   JESSICA NUNN MD   System Medical Director    Utilization Management   [facility].

## 2020-12-18 NOTE — H&P
[facility]    History and Physical  Hospitalist       Date of Admission:  12/18/2020  Date of Service (when I saw the patient): 12/18/20    Assessment & Plan   Toshia Vela is a 75 year old female with a past medical history of CVA with residual left hemiparesis, asthma, hypertension, hyperlipidemia, chronic headaches, and GERD who presented to [facility] ED with nausea, vomiting, and dizziness.  A code stroke was initiated in the emergency department, and patient was found to have evidence of acute CVA.  She was transferred to [facility] for further evaluation and monitoring.    Acute CVA in the setting of previous CVA with residual left hemiplegia  * Presents with acute onset of nausea, vomiting, and vertigo-like dizziness.  Presented to the ER and a code stroke was initiated.  Patient was evaluated by Dr. Oh.  Initial vital signs notable for blood pressure 167/86.  Pulse normal at 70.  EKG shows normal sinus rhythm with nonspecific T wave abnormality, no overt ischemia.  Lab work shows a negative troponin, potassium 3.2, glucose 117, creatinine 0.56, WBC 5.1, hemoglobin 13.2, platelet count 189.  COVID-19 swab is negative.  CT aortic survey shows unremarkable thoracic and abdominal aorta.  Lungs are clear.   Patient had numerous findings on brain MRI/MRI including focal, acute/subacute nonhemorrhagic infarct of the right parietal lobe near the vertex.  There may be some mild associated gyral swelling but without other mass-effect.  There is also noted numerous supratentorial and infratentorial infarcts.  MRA shows stenosis in the left MCA bifurcation, without evidence of large vessel occlusion.  There is moderately severe focal stenosis of the P2 segment of the right PCA.  Given the timing of her symptoms, she was deemed not candidate for tPA.  Was given aspirin, IV fluids, Zofran, and meclizine.  --Admit to inpatient  --Stroke neurology  consulted  --Monitor telemetry  --Ordered echocardiogram  --Ordered lipid panel and A1c  --SLP for swallow eval  --PT and OT evals ordered  --Aspirin daily.  Patient on Aggrenox PTA, defer antiplatelet choice to neurology.  --IV labetalol and hydralazine as needed per stroke protocol  --Neurochecks    Hypertension  Hyperlipidemia  --Resume PTA Lipitor 10 mg  --Permissive hypertension  --Does not appear to be on HTN medications PTA  --IV labetalol and hydralazine as needed     Mild intermittent asthma  Lungs clear to auscultation.  Denies shortness of breath or wheezing.  --Resume PTA Advair    Recent eye procedure, and history of left eye enucleation  --Resume PTA eyedrops    GERD  --Resume prior to admission PPI      Asymptomatic COVID-19 testing was performed and is negative as of 12/17.  Low suspicion.      DVT Prophylaxis: Pneumatic Compression Devices  Code Status: Full Code    Disposition: Inpatient status, anticipate 2 or more days of hospitalization pending neurology work-up, PT/OT/SLP eval.  SW consulted.    Rosendo Orozco    Primary Care Physician   Dr. [place] Park    Chief Complaint   Nausea, Vomiting, Dizziness     History is obtained from the patient and ED provider documentation.      A Burundian speaking  using the [employer] device was utilized for her interview and exam.    History of Present Illness    A [place]ian speaking  using the [employer] device was utilized for her interview and exam.    Toshia Vela is a pleasant 75 year old female with a past medical history of CVA with residual left hemiparesis, asthma, hypertension, hyperlipidemia, chronic headaches, and GERD who presents with nausea, vomiting, and dizziness.    Patient reports that she developed nausea, vomiting, dizziness at 10:00 on 12/17.  She reports vertigo-like dizziness, with the room spinning.  She also did report some chest pain initially which resolved.  Denies fever, chills, headache,  shortness of breath, abdominal pain, diarrhea, dysuria, speech change, loss of taste or smell, or vision changes.  Her symptoms persisted and she ended up arriving to the emergency department at [facility] around 1700.    In the emergency department, a code stroke was initiated.  Patient was evaluated by Dr. Oh.  Initial vital signs notable for blood pressure 167/86.  Pulse normal at 70.  EKG shows normal sinus rhythm with nonspecific T wave abnormality, no overt ischemia.  Lab work shows a negative troponin, potassium 3.2, glucose 117, creatinine 0.56, WBC 5.1, hemoglobin 13.2, platelet count 189.  COVID-19 swab is negative.  CT aortic survey shows unremarkable thoracic and abdominal aorta.  Lungs are clear.   Patient had numerous findings on brain MRI/MRI including focal, acute/subacute nonhemorrhagic infarct of the right parietal lobe near the vertex.  There may be some mild associated gyral swelling but without other mass-effect.  There is also noted numerous supratentorial and infratentorial infarcts.  MRA shows stenosis in the left MCA bifurcation, without evidence of large vessel occlusion.  There is moderately severe focal stenosis of the P2 segment of the right PCA.  Given the timing of her symptoms, she was deemed not candidate for tPA.  Was given aspirin, IV fluids, Zofran, and meclizine.  No beds available at [facility], therefore was transferred here to [facility].    Patient resting comfortably in bed on my arrival.  She reports ongoing vertigo, feeling like she is spinning.  She denies headache, new focal weakness, speech change, chest pain or tightness, cough, fever, chills, abdominal pain, vomiting, diarrhea, dysuria.  She does have some mild nausea associated with her dizziness.  Per the , she is stumbling over her words.    Past Medical History      Chronic headaches    Hyperlipidemia    Hypertension    Mild intermittent asthma    Osteopenia    History of CVA, with residual left  hemiparesis    GERD    Legally blind in right eye    Memory loss    Past Surgical History   I have reviewed this patient's surgical history and updated it with pertinent information if needed.  Past Surgical History:   Procedure Laterality Date     ENUCLEATION - left eye         Prior to Admission Medications   Prior to Admission Medications   Prescriptions Last Dose Informant Patient Reported? Taking?   Emollient (CETAPHIL) CREA   No No   Sig: Use it daily to the dry skin   PRED FORTE 1 % ophthalmic suspension   Yes No   Sig: Place 1 drop into the right eye 2 times daily   aspirin-dipyridamole (AGGRENOX)  MG per 12 hr capsule   No No   Sig: Take 1 capsule by mouth 2 times daily   atorvastatin (LIPITOR) 10 MG tablet   Yes No   Sig: Take 10 mg by mouth three times a week   calcium carb 1250 mg, 500 mg [place],/vitamin D 200 units (OSCAL WITH D) 500-200 MG-UNIT per tablet   No No   Sig: Take 1 tablet by mouth 2 times daily (with meals)   cholecalciferol (VITAMIN D) 1000 UNIT tablet   No No   Sig: Take 1 tablet by mouth daily.   erythromycin (ROMYCIN) ophthalmic ointment   Yes No   Sig: Place  into both eyes At Bedtime.   fluticasone-salmeterol (ADVAIR) 250-50 MCG/DOSE diskus inhaler   No No   Sig: Inhale 1 puff into the lungs 2 times daily Need to make appointment   olopatadine (PATADAY) 0.2 % ophthalmic solution   Yes No   Sig: Place 1 drop into both eyes daily   omeprazole (PRILOSEC) 20 MG DR capsule   Yes No   Sig: Take 20 mg by mouth daily as needed   triamcinolone (KENALOG) 0.1 % cream   No No   Sig: Apply sparingly to affected area twice a day      Facility-Administered Medications: None     Allergies   No Known Allergies    Social History   Patient reports that she is a lifelong non-smoker.  She does not use alcohol.  Does not use illicit drugs.  She states she lives with her daughter.  She is able to ambulate without the use of assistive devices at baseline.    Family History   Patient denies any family  history of CVA, heart disease or heart attack.  Reviewed and noncontributory.    Review of Systems   The 10 point Review of Systems is negative other than noted in the HPI.    Physical Exam   Temp: 97.9  F (36.6  C) Temp src: Oral BP: (!) 145/73 Pulse: 65   Resp: 16 SpO2: 97 % O2 Device: None (Room air)    Vital Signs with Ranges  Temp:  [97.9  F (36.6  C)-98.5  F (36.9  C)] 97.9  F (36.6  C)  Pulse:  [62-78] 65  Resp:  [12-22] 16  BP: (130-172)/(49-90) 145/73  SpO2:  [96 %-100 %] 97 %  0 lbs 0 oz    Constitutional: Awake, alert, cooperative, no apparent distress.   ENT: Normocephalic, without obvious abnormality, atraumatic, oral pharynx with moist mucus membranes, tonsils without erythema or exudates.  Eyes: Left eye enucleation, right eye pupil round, normal sclera  Neck: Supple, symmetrical, trachea midline  Pulmonary: No increased work of breathing, good air exchange, clear to auscultation bilaterally, no crackles or wheezing.  Cardiovascular: Regular rate and rhythm, normal S1 and S2, and no murmur noted.  GI: Normal bowel sounds, soft, non-distended, non-tender.   Skin/Integumen: Warm, dry, no rashes seen on exposed skin.  Neuro: No facial droop.  Left upper extremity with 4+/5 strength, right upper extremity with 5/5 strength.  Bilateral lower extremities with 5/5 strength.  Sensation intact in all 4 extremities.  Psych:  Alert and oriented to self, date, situation.  Normal affect.  Extremities: Trace ankle edema bilaterally.  Limbs appear atraumatic.    Data   Data reviewed today:  I personally reviewed her EKG showing sinus rhythm with nonspecific T wave changes.  No overt evidence of ischemia.  Recent Labs   Lab 12/17/20  1730   WBC 5.1   HGB 13.2   MCV 95      INR 1.00      POTASSIUM 3.2*   CHLORIDE 107   CO2 28   BUN 10   CR 0.56   ANIONGAP 6   ENEIDA 9.0   *   TROPI <0.015       Most Recent 3 Troponin's:  Recent Labs   Lab Test 12/17/20  1730 03/07/14  1149   TROPI <0.015 <0.012        Recent Results (from the past 24 hour(s))   CT Aortic Survey w Contrast    Narrative    EXAM: CT AORTIC SURVEY W CONTRAST  LOCATION: [facility]  DATE/TIME: 12/17/2020 6:15 PM    INDICATION: Chest pain and dizziness. History of paralytic stroke.  COMPARISON: None.  TECHNIQUE: CT angiogram chest abdomen pelvis during arterial phase of injection of IV contrast. 2D and 3D MIP reconstructions were performed by the CT technologist. Dose reduction techniques were used.   CONTRAST: 80mL Isovue-370    FINDINGS:   CT ANGIOGRAM CHEST, ABDOMEN, AND PELVIS: No evidence for thoracic or abdominal aortic dissection or aneurysm. Minimal atherosclerotic disease. The great vessels arising from the aortic arch are widely patent. The mesenteric vessels and renal arteries are   also widely patent    LUNGS AND PLEURA: The lungs are clear. No infiltrates or effusions. No nodules or masses.    MEDIASTINUM/AXILLAE: No adenopathy. Moderate size esophageal hiatal hernia.    HEPATOBILIARY: Diffuse fatty infiltration of the liver.    PANCREAS: Normal.    SPLEEN: Normal.    ADRENAL GLANDS: Normal.    KIDNEYS/BLADDER: Normal.    BOWEL: Normal appendix. No evidence for bowel obstruction. No bowel wall thickening or inflammatory changes.    LYMPH NODES: Normal.    PELVIC ORGANS: Normal.    MUSCULOSKELETAL: Calcified injection granulomas both buttocks.      Impression    IMPRESSION:  1.  Thoracic and abdominal aorta are unremarkable.  2.  The lungs are clear. No evidence for acute pulmonary disease.  3.  Esophageal hiatal hernia.  4.  Fatty liver.     MR Brain w/o Contrast    Narrative    EXAM: MR BRAIN W/O CONTRAST  LOCATION: [facility]  DATE/TIME: 12/17/2020 7:51 PM    INDICATION: Vertigo.  COMPARISON: Brain MRA and neck MRA from today. Brain MR 3/7/2014.  TECHNIQUE: Routine multiplanar multisequence head MRI without intravenous contrast.    FINDINGS:  INTRACRANIAL CONTENTS: There is a new, focal region of highly  restricted diffusion measuring 1.4 cm anteroposterior x 1.1 cm transverse x 1.1 cm craniocaudal involving the right parietal lobe near the vertex, see image 43 of series 3.2. This is   consistent with a focus of acute/subacute nonhemorrhagic infarct. There may be mild gyral swelling in this location. No mass, acute hemorrhage, or extra-axial fluid collections. Confluent nonspecific T2/FLAIR hyperintensities within the cerebral white   matter most consistent with advanced microvascular ischemic change. There are old appearing infarcts at the frontal vertex bilaterally. Old appearing left cerebellar hemispheric dominant infarct with numerous other smaller right cerebellar hemispheric   infarcts. There is linear-type infarct in the right paramedian midbrain. Moderate generalized cerebral atrophy. No hydrocephalus. Normal position of the cerebellar tonsils.     SELLA: Partially empty sella.    OSSEOUS STRUCTURES/SOFT TISSUES: Normal marrow signal. The major intracranial vascular flow voids are maintained.     ORBITS: No acute orbital abnormality. There appears to be left globe prosthesis.     SINUSES/MASTOIDS: Mild membrane thickening left maxillary sinus. No middle ear or mastoid effusion.       Impression    IMPRESSION:  1.  Focal, acute/subacute nonhemorrhagic infarct right parietal lobe near the vertex. There may be mild associated gyral swelling but without other mass effect. No other mass effect.    2.  Numerous supratentorial and infratentorial infarcts as detailed above.    3.  Atrophy and chronic small vessel vascular changes.    4.  Linear-type infarct also noted in the right paramedian midbrain.   MRA Angiogram Neck w/o Contrast    Narrative    EXAM: MRA BRAIN ([place]) WO CONTRAST, MRA NECK (CAROTIDS) WO CONTRAST  LOCATION: [facility]  DATE/TIME: 12/17/2020 7:51 PM    INDICATION: Dizziness. Vertigo. History of stroke.  COMPARISON: Brain MR from today.  TECHNIQUE:   HEAD MRA: 3D  time-of-flight head MRA without intravenous contrast.  NECK MRA: Neck MRA without IV contrast.    FINDINGS:  HEAD MRA:   ANTERIOR CIRCULATION: There are mild to moderate stenoses noted at the left MCA bifurcation. No other definite anterior circulation stenosis. No dissection or vascular malformation. The carotid siphons are patent bilaterally. Standard [place]   anatomy.    POSTERIOR CIRCULATION: Moderately severe focal stenosis involving the P2 segment of the right posterior cerebral artery. There are multiple mild stenoses noted in the mid left posterior cerebral artery. The right vertebral artery is dominant. Left   vertebral  effectively ends in the left posterior inferior cerebellar artery.     NECK MRA:   RIGHT CAROTID: No measurable stenosis or dissection.    LEFT CAROTID: Atherosclerotic plaque results in less than 50% stenosis in the left ICA. No dissection.    VERTEBRAL ARTERIES: Dominant right vertebral artery. The left vertebral demonstrates small caliber but with patent flow related signal to the level of the left posterior inferior cerebellar artery.    AORTIC ARCH: The aortic arch is not fully included within the imaged field of view.      Impression    IMPRESSION:    HEAD MRA:   1.  Stenoses noted in the left MCA bifurcation, detailed above. No evidence of large vessel occlusion.    2.  Moderately severe focal stenosis P2 segment of the right posterior cerebral artery.    NECK MRA:  1.  Dominant right vertebral artery with small caliber left vertebral that affectively ends in the left posterior inferior cerebellar artery.   MRA Brain ([place]) wo Contrast    Narrative    EXAM: MRA BRAIN ([place]) WO CONTRAST, MRA NECK (CAROTIDS) WO CONTRAST  LOCATION: [facility]  DATE/TIME: 12/17/2020 7:51 PM    INDICATION: Dizziness. Vertigo. History of stroke.  COMPARISON: Brain MR from today.  TECHNIQUE:   HEAD MRA: 3D time-of-flight head MRA without intravenous  contrast.  NECK MRA: Neck MRA without IV contrast.    FINDINGS:  HEAD MRA:   ANTERIOR CIRCULATION: There are mild to moderate stenoses noted at the left MCA bifurcation. No other definite anterior circulation stenosis. No dissection or vascular malformation. The carotid siphons are patent bilaterally. Standard [place] of [place]   anatomy.    POSTERIOR CIRCULATION: Moderately severe focal stenosis involving the P2 segment of the right posterior cerebral artery. There are multiple mild stenoses noted in the mid left posterior cerebral artery. The right vertebral artery is dominant. Left   vertebral  effectively ends in the left posterior inferior cerebellar artery.     NECK MRA:   RIGHT CAROTID: No measurable stenosis or dissection.    LEFT CAROTID: Atherosclerotic plaque results in less than 50% stenosis in the left ICA. No dissection.    VERTEBRAL ARTERIES: Dominant right vertebral artery. The left vertebral demonstrates small caliber but with patent flow related signal to the level of the left posterior inferior cerebellar artery.    AORTIC ARCH: The aortic arch is not fully included within the imaged field of view.      Impression    IMPRESSION:    HEAD MRA:   1.  Stenoses noted in the left MCA bifurcation, detailed above. No evidence of large vessel occlusion.    2.  Moderately severe focal stenosis P2 segment of the right posterior cerebral artery.    NECK MRA:  1.  Dominant right vertebral artery with small caliber left vertebral that affectively ends in the left posterior inferior cerebellar artery.

## 2020-12-18 NOTE — ED NOTES
Assisted patient to BR. Patient tolerated transfer. Voided without difficulty.  continues to be ipad assisting. Patient able to get back into bed independently. Assisted patient with putting on her glasses. Patient does not have any questions for RN at this time. Patient updated about MRI. Patient agreeable to plan.

## 2020-12-19 ENCOUNTER — APPOINTMENT (OUTPATIENT)
Dept: PHYSICAL THERAPY | Facility: CLINIC | Age: 75
DRG: 065 | End: 2020-12-19
Attending: PHYSICIAN ASSISTANT
Payer: COMMERCIAL

## 2020-12-19 ENCOUNTER — APPOINTMENT (OUTPATIENT)
Dept: OCCUPATIONAL THERAPY | Facility: CLINIC | Age: 75
DRG: 065 | End: 2020-12-19
Attending: PHYSICIAN ASSISTANT
Payer: COMMERCIAL

## 2020-12-19 LAB
ANION GAP SERPL CALCULATED.3IONS-SCNC: 7 MMOL/L (ref 3–14)
BUN SERPL-MCNC: 8 MG/DL (ref 7–30)
CALCIUM SERPL-MCNC: 8.9 MG/DL (ref 8.5–10.1)
CHLORIDE SERPL-SCNC: 112 MMOL/L (ref 94–109)
CHOLEST SERPL-MCNC: 109 MG/DL
CO2 SERPL-SCNC: 25 MMOL/L (ref 20–32)
CREAT SERPL-MCNC: 0.52 MG/DL (ref 0.52–1.04)
ERYTHROCYTE [DISTWIDTH] IN BLOOD BY AUTOMATED COUNT: 12.4 % (ref 10–15)
GFR SERPL CREATININE-BSD FRML MDRD: >90 ML/MIN/{1.73_M2}
GLUCOSE SERPL-MCNC: 83 MG/DL (ref 70–99)
HBA1C MFR BLD: 5.6 % (ref 0–5.6)
HCT VFR BLD AUTO: 37.4 % (ref 35–47)
HDLC SERPL-MCNC: 62 MG/DL
HGB BLD-MCNC: 12.2 G/DL (ref 11.7–15.7)
LDLC SERPL CALC-MCNC: 37 MG/DL
MCH RBC QN AUTO: 30.1 PG (ref 26.5–33)
MCHC RBC AUTO-ENTMCNC: 32.6 G/DL (ref 31.5–36.5)
MCV RBC AUTO: 92 FL (ref 78–100)
NONHDLC SERPL-MCNC: 47 MG/DL
PLATELET # BLD AUTO: 196 10E9/L (ref 150–450)
POTASSIUM SERPL-SCNC: 3.7 MMOL/L (ref 3.4–5.3)
POTASSIUM SERPL-SCNC: 4.1 MMOL/L (ref 3.4–5.3)
RBC # BLD AUTO: 4.05 10E12/L (ref 3.8–5.2)
SODIUM SERPL-SCNC: 144 MMOL/L (ref 133–144)
TRIGL SERPL-MCNC: 51 MG/DL
TROPONIN I SERPL-MCNC: <0.015 UG/L (ref 0–0.04)
TROPONIN I SERPL-MCNC: <0.015 UG/L (ref 0–0.04)
WBC # BLD AUTO: 3.5 10E9/L (ref 4–11)

## 2020-12-19 PROCEDURE — 97530 THERAPEUTIC ACTIVITIES: CPT | Mod: GP | Performed by: PHYSICAL THERAPIST

## 2020-12-19 PROCEDURE — 97166 OT EVAL MOD COMPLEX 45 MIN: CPT | Mod: GO | Performed by: OCCUPATIONAL THERAPIST

## 2020-12-19 PROCEDURE — 80061 LIPID PANEL: CPT | Performed by: STUDENT IN AN ORGANIZED HEALTH CARE EDUCATION/TRAINING PROGRAM

## 2020-12-19 PROCEDURE — 120N000001 HC R&B MED SURG/OB

## 2020-12-19 PROCEDURE — 97535 SELF CARE MNGMENT TRAINING: CPT | Mod: GO | Performed by: OCCUPATIONAL THERAPIST

## 2020-12-19 PROCEDURE — 84484 ASSAY OF TROPONIN QUANT: CPT | Performed by: HOSPITALIST

## 2020-12-19 PROCEDURE — 97116 GAIT TRAINING THERAPY: CPT | Mod: GP | Performed by: PHYSICAL THERAPIST

## 2020-12-19 PROCEDURE — 250N000013 HC RX MED GY IP 250 OP 250 PS 637: Performed by: INTERNAL MEDICINE

## 2020-12-19 PROCEDURE — 97530 THERAPEUTIC ACTIVITIES: CPT | Mod: GO | Performed by: OCCUPATIONAL THERAPIST

## 2020-12-19 PROCEDURE — 250N000013 HC RX MED GY IP 250 OP 250 PS 637: Performed by: PHYSICIAN ASSISTANT

## 2020-12-19 PROCEDURE — 36415 COLL VENOUS BLD VENIPUNCTURE: CPT | Performed by: PHYSICIAN ASSISTANT

## 2020-12-19 PROCEDURE — 99233 SBSQ HOSP IP/OBS HIGH 50: CPT | Performed by: INTERNAL MEDICINE

## 2020-12-19 PROCEDURE — 85027 COMPLETE CBC AUTOMATED: CPT | Performed by: PHYSICIAN ASSISTANT

## 2020-12-19 PROCEDURE — 80061 LIPID PANEL: CPT | Performed by: PHYSICIAN ASSISTANT

## 2020-12-19 PROCEDURE — 36415 COLL VENOUS BLD VENIPUNCTURE: CPT | Performed by: STUDENT IN AN ORGANIZED HEALTH CARE EDUCATION/TRAINING PROGRAM

## 2020-12-19 PROCEDURE — 84132 ASSAY OF SERUM POTASSIUM: CPT | Performed by: PHYSICIAN ASSISTANT

## 2020-12-19 PROCEDURE — 99221 1ST HOSP IP/OBS SF/LOW 40: CPT | Performed by: INTERNAL MEDICINE

## 2020-12-19 PROCEDURE — 84484 ASSAY OF TROPONIN QUANT: CPT | Performed by: PHYSICIAN ASSISTANT

## 2020-12-19 PROCEDURE — 36415 COLL VENOUS BLD VENIPUNCTURE: CPT | Performed by: HOSPITALIST

## 2020-12-19 PROCEDURE — 80048 BASIC METABOLIC PNL TOTAL CA: CPT | Performed by: STUDENT IN AN ORGANIZED HEALTH CARE EDUCATION/TRAINING PROGRAM

## 2020-12-19 PROCEDURE — 250N000013 HC RX MED GY IP 250 OP 250 PS 637: Performed by: STUDENT IN AN ORGANIZED HEALTH CARE EDUCATION/TRAINING PROGRAM

## 2020-12-19 PROCEDURE — 83036 HEMOGLOBIN GLYCOSYLATED A1C: CPT | Performed by: STUDENT IN AN ORGANIZED HEALTH CARE EDUCATION/TRAINING PROGRAM

## 2020-12-19 RX ORDER — LISINOPRIL 10 MG/1
10 TABLET ORAL DAILY
Status: DISCONTINUED | OUTPATIENT
Start: 2020-12-19 | End: 2020-12-20

## 2020-12-19 RX ORDER — ATORVASTATIN CALCIUM 40 MG/1
80 TABLET, FILM COATED ORAL EVERY EVENING
Status: DISCONTINUED | OUTPATIENT
Start: 2020-12-19 | End: 2020-12-20

## 2020-12-19 RX ORDER — ASPIRIN 81 MG/1
81 TABLET, CHEWABLE ORAL DAILY
Status: DISCONTINUED | OUTPATIENT
Start: 2020-12-19 | End: 2020-12-21 | Stop reason: HOSPADM

## 2020-12-19 RX ADMIN — TICAGRELOR 180 MG: 90 TABLET ORAL at 09:59

## 2020-12-19 RX ADMIN — PREDNISOLONE ACETATE 1 DROP: 10 SUSPENSION/ DROPS OPHTHALMIC at 20:40

## 2020-12-19 RX ADMIN — ASPIRIN 81 MG: 81 TABLET, CHEWABLE ORAL at 09:59

## 2020-12-19 RX ADMIN — PREDNISOLONE ACETATE 1 DROP: 10 SUSPENSION/ DROPS OPHTHALMIC at 10:00

## 2020-12-19 RX ADMIN — Medication 1000 UNITS: at 09:59

## 2020-12-19 RX ADMIN — ACETAMINOPHEN 650 MG: 325 TABLET, FILM COATED ORAL at 12:46

## 2020-12-19 RX ADMIN — LISINOPRIL 10 MG: 10 TABLET ORAL at 12:37

## 2020-12-19 RX ADMIN — ERYTHROMYCIN: 5 OINTMENT OPHTHALMIC at 20:40

## 2020-12-19 RX ADMIN — OLOPATADINE HYDROCHLORIDE 1 DROP: 2 SOLUTION/ DROPS OPHTHALMIC at 10:00

## 2020-12-19 ASSESSMENT — ACTIVITIES OF DAILY LIVING (ADL)
ADLS_ACUITY_SCORE: 19
ADLS_ACUITY_SCORE: 18
ADLS_ACUITY_SCORE: 19
ADLS_ACUITY_SCORE: 18
ADLS_ACUITY_SCORE: 19
ADLS_ACUITY_SCORE: 19

## 2020-12-19 NOTE — PLAN OF CARE
A/O x3, disoriented to time, Fijian speaking using jabber to interpret. AVSS, ex HTN, on RA. Tele SR. L hemiparesis - residual from previous stroke, R eye blurry vision, fixed, tracking left to right, left eye absent - baseline. Denies pain, nausea. C/o dizziness, NS infusing @ 100ml/hr. Assist x1, GB/walker to BR, voiding adequately, no BM this shift. Regular diet, fair appetite, tolerating well. Echo completed today. Neurology following. PT recommending home with home PT. OT consult. Updated daughter this evening on patient status. Discharge pending clinical course, will continue to monitor.

## 2020-12-19 NOTE — PROGRESS NOTES
MD Notification    Notified Person: MD    Notified Person Name: Jamie    Notification Date/Time: 121/18/20 2046    Notification Interaction: text page    Purpose of Notification: new c/o chest pain/dizziness. VSS on RA. Tele NSR    Orders Received: no    Comments:

## 2020-12-19 NOTE — PROGRESS NOTES
[facility]    Hospitalist Progress Note    Date of Service (when I saw the patient): 12/19/2020    Assessment & Plan   Toshia Vela is a 75 year old female who was admitted on 12/18/2020.  Assessment & Plan     Toshia Vela is a 75 year old female with a past medical history of CVA with residual left hemiparesis, asthma, hypertension, hyperlipidemia, chronic headaches, and GERD who presented to [facility] ED with nausea, vomiting, and dizziness.  A code stroke was initiated in the emergency department, and patient was found to have evidence of acute CVA.  She was transferred to [facility] for further evaluation and monitoring.     Acute CVA in the setting of previous CVA with residual left hemiplegia;  Focal, acute/subacute nonhemorrhagic infarct right parietal lobe near the vertex  Ruled out for COVID-19  * Presents with acute onset of nausea, vomiting, and vertigo-like dizziness.  Presented to the ER and a code stroke was initiated.  Patient was evaluated by Dr. Oh.  Initial vital signs notable for blood pressure 167/86.  Pulse normal at 70.  EKG shows normal sinus rhythm with nonspecific T wave abnormality, no overt ischemia.  Lab work shows a negative troponin, potassium 3.2, glucose 117, creatinine 0.56, WBC 5.1, hemoglobin 13.2, platelet count 189.  COVID-19 swab is negative.  CT aortic survey shows unremarkable thoracic and abdominal aorta.  Lungs are clear.   Patient had numerous findings on brain MRI/MRI including focal, acute/subacute nonhemorrhagic infarct of the right parietal lobe near the vertex.  There may be some mild associated gyral swelling but without other mass-effect.  There is also noted numerous supratentorial and infratentorial infarcts.  MRA shows stenosis in the left MCA bifurcation, without evidence of large vessel occlusion.  There is moderately severe focal stenosis of the P2 segment of the right PCA.  Given the timing of  her symptoms, she was deemed not candidate for tPA.  Was given aspirin, IV fluids, Zofran, and meclizine.  --Complains of intermittent dizziness from the stroke  Neurology consulted and are following  Started her on baby aspirin and Brilinta 90 mg p.o. twice daily to prevent further strokes  Increase Lipitor from 10 mg daily to 80 mg p.o. daily during this hospitalization as patient had 6 strokes so far as per the family  Transthoracic echo done showed ejection fraction of 60 to 65%.  Proximal septal thickening is noted.  There is no thrombus seen in the left ventricle.  Bubble study was negative    Recurrent chest pain;  Patient had chest pain last night twelve-lead EKG was done RRT was called and was negative serial troponins ordered and returned negative  Patient complained of chest pain again this morning while working with physical therapy which only lasted for few minutes and then resolved  Differential includes angina versus pain caused by anxiety during this hospitalization  With her risk factors we will request cardiology consultation regarding further evaluation of chest pain    Hypertension  Hyperlipidemia  --Increase Lipitor to 80 mg p.o. daily  --Start lisinopril 10 mg p.o. daily for better blood pressure control and adjust medications with a goal blood pressure less than 130 x 80  --Does not appear to be on HTN medications PTA  --IV labetalol and hydralazine as needed      Mild intermittent asthma  Lungs clear to auscultation.  Denies shortness of breath or wheezing.  --Resume PTA Advair     Recent eye procedure, and history of left eye enucleation  --Resume PTA eyedrops     GERD  --Resume prior to admission PPI        Asymptomatic COVID-19 testing was performed and is negative as of 12/17.  Low suspicion.        DVT Prophylaxis: Pneumatic Compression Devices  Code Status: Full Code    Disposition: Expected discharge in the next 2days after neurology and cardiac evaluation complete    Discussed with  bedside RN, patient and both her daughters over the phone today.  Their multiple questions were answered    Greater than 35 minutes were spent in taking care of her today  Xiomy Lay MD  [phone] (P)      Interval History      Chart reviewed.  Care assumed today  Patient resting comfortably in bed.  Currently denies any chest pain.  She had chest pain last night for which RRT was called.  Again had chest pain while working with therapy today.  Complains of intermittent dizziness from the stroke    -Data reviewed today: I reviewed all new labs and imaging results over the last 24 hours. I personally reviewed no images or EKG's today.    Physical Exam   Temp: 97.9  F (36.6  C) Temp src: Oral BP: (!) 144/80 Pulse: 85   Resp: 16 SpO2: 98 % O2 Device: None (Room air)    Vitals:    12/18/20 1229   Weight: 78 kg (171 lb 15.3 oz)     Vital Signs with Ranges  Temp:  [97.4  F (36.3  C)-98.9  F (37.2  C)] 97.9  F (36.6  C)  Pulse:  [68-85] 85  Resp:  [16-24] 16  BP: (142-155)/(77-83) 144/80  SpO2:  [95 %-100 %] 98 %  I/O last 3 completed shifts:  In: 1102 [P.O.:360; I.V.:742]  Out: -     Constitutional: Awake, alert, cooperative, no apparent distress  Respiratory: Clear to auscultation bilaterally, no crackles or wheezing  Cardiovascular: Regular rate and rhythm, normal S1 and S2, and no murmur noted  GI: Normal bowel sounds, soft, non-distended, non-tender  Skin/Integumen: No rashes, no cyanosis, no edema  Other:     Medications     - MEDICATION INSTRUCTIONS -       - MEDICATION INSTRUCTIONS -         aspirin  81 mg Oral Daily     atorvastatin  80 mg Oral QPM     erythromycin   Both Eyes At Bedtime     fluticasone-vilanterol  1 puff Inhalation Daily     lisinopril  10 mg Oral Daily     olopatadine  1 drop Both Eyes Daily     prednisoLONE acetate  1 drop Right Eye BID     sodium chloride (PF)  3 mL Intracatheter Q8H     [START ON 12/20/2020] ticagrelor  90 mg Oral BID     vitamin D3  1,000 Units Oral Daily       Data    Recent Labs   Lab 20  0550 20  0258 20  2100 20  1730   WBC  --   --   --  5.1   HGB  --   --   --  13.2   MCV  --   --   --  95   PLT  --   --   --  189   INR  --   --   --  1.00   NA  --   --   --  141   POTASSIUM  --  4.1 3.4 3.2*   CHLORIDE  --   --   --  107   CO2  --   --   --  28   BUN  --   --   --  10   CR  --   --   --  0.56   ANIONGAP  --   --   --  6   ENEIDA  --   --   --  9.0   GLC  --   --   --  117*   TROPI <0.015 <0.015 <0.015 <0.015       Recent Results (from the past 24 hour(s))   Echocardiogram Complete - Bubble study    Narrative    [id number]  [id number]  [id number]  [id number]^^EVANGELINA^[facility]  Echocardiography Laboratory  [address] [place] [address]        Name: RICARDO GAGE  MRN: [id number]  : 1945  Study Date: 2020 02:03 PM  Age: 75 yrs  Gender: Female  Patient Location: [facility]  Reason For Study: CVA  Ordering Physician: EVANGELINA RAMIREZ  Referring Physician: LARA MCFARLAND  Performed By: Prashant Gentile RDCS     BSA: 1.8 m2  Height: 63 in  Weight: 177 lb  HR: 73  BP: 145/73 mmHg  _____________________________________________________________________________  __        Procedure  Complete Portable Bubble Echo Adult. Optison (NDC #4652-6255) given  intravenously.  _____________________________________________________________________________  __        Interpretation Summary     The left ventricle is normal in size. Proximal septal thickening is noted.  Left ventricular systolic function is normal. The visual ejection fraction is  estimated at 60-65%. Left ventricular diastolic function is indeterminate. No  regional wall motion abnormalities noted. There is no thrombus seen in the  left ventricle.  The right ventricle is normal size. The right ventricular systolic function is  normal.  Normal left atrial size. Right atrial size is normal. A contrast injection  (Bubble Study) was performed that was  negative for flow across the interatrial  septum.  Trace mitral and tricuspid regurgitation.  No pericardial effusion.  No previous study for comparison.  _____________________________________________________________________________  __        Left Ventricle  The left ventricle is normal in size. Proximal septal thickening is noted.  Left ventricular systolic function is normal. The visual ejection fraction is  estimated at 60-65%. Left ventricular diastolic function is indeterminate. No  regional wall motion abnormalities noted. There is no thrombus seen in the  left ventricle.     Right Ventricle  The right ventricle is normal size. The right ventricular systolic function is  normal.     Atria  Normal left atrial size. Right atrial size is normal. A contrast injection  (Bubble Study) was performed that was negative for flow across the interatrial  septum.     Mitral Valve  There is trace mitral regurgitation.        Tricuspid Valve  There is trace tricuspid regurgitation. The right ventricular systolic  pressure is approximated at 27.9 mmHg plus the right atrial pressure.     Aortic Valve  The aortic valve is not well visualized. No aortic regurgitation is present.  No aortic stenosis is present.     Pulmonic Valve  There is no pulmonic valvular stenosis.     Vessels  The aortic root is normal size. The inferior vena cava is normal.     Pericardium  There is no pericardial effusion.        Rhythm  Sinus rhythm was noted.  _____________________________________________________________________________  __  MMode/2D Measurements & Calculations  IVSd: 1.1 cm     LVIDd: 3.4 cm  LVIDs: 2.1 cm  LVPWd: 0.95 cm  FS: 40.2 %  LV mass(C)d: 104.6 grams  LV mass(C)dI: 57.0 grams/m2  Ao root diam: 3.0 cm  LA dimension: 3.2 cm  LA/Ao: 1.1  LA Volume (BP): 39.8 ml  LA Volume Index (BP): 21.6 ml/m2  RWT: 0.55           Doppler Measurements & Calculations  MV E max stef: 70.3 cm/sec  MV A max stef: 112.5 cm/sec  MV E/A: 0.62  MV dec  time: 0.20 sec  TR max stef: 263.9 cm/sec  TR max P.9 mmHg  E/E' av.2  Lateral E/e': 11.1  Medial E/e': 13.2           _____________________________________________________________________________  __           Report approved by: Kamini Cutler 2020 04:10 PM

## 2020-12-19 NOTE — PLAN OF CARE
VSS, LS clear, NSR.  Neuros consistent with baseline.  Seems oriented as assessed through , however, forgetful and impulsive.  Denies pain.  Unsteady on feel, Ax1 w/ GB and walker.  +void.

## 2020-12-19 NOTE — PROGRESS NOTES
"Neurology Stroke Interval Updated Progress Note     Spoke to pharmacy   Co-pay is 0$ for Brilinta   Will stop aggrenox and load with Brilinta 180 mg now   Start Brilinta 90 mg BID tomorrow in addition ASA 81 mg.   Will likely need to stay on this lifelong and follow up in stroke clinic, we will place that order   Will call daughter and update her on the plan.     Thank you for the consult, we will sign off at this time. Please page with any additional question.     Nasir Guillaume MD  Vascular Neurology Fellow  To page me or covering stroke neurology team member, click here: AMCOM   Choose \"On Call\" tab at top, then search dropdown box for \"Neurology Adult\", select location, press Enter, then look for stroke/neuro ICU/telestroke.      "

## 2020-12-19 NOTE — PROVIDER NOTIFICATION
"MD Notification    Notified Person: MD    Notified Person Name: YANA    Notification Date/Time: 10/19/20 0858    Notification Interaction: web-based paging    Purpose of Notification: \"FYI Pt c/o chest pain with OT. pt described as same as last evening RRT, comes and goes, chest pain now gone. Thank you.  \"    Orders Received:    Comments:    "

## 2020-12-19 NOTE — PROGRESS NOTES
MD Notification    Notified Person: MD    Notified Person Name: Jamie    Notification Date/Time: 12\18\2020    Notification Interaction: text page    Purpose of Notification: K 3.2    Orders Received:    Comments:

## 2020-12-19 NOTE — PROGRESS NOTES
12/19/20 0840   Quick Adds   Type of Visit Initial Occupational Therapy Evaluation       Present yes  (iPad)   Language Australian   Living Environment   People in home child(germain), adult   Current Living Arrangements apartment   Home Accessibility no concerns   Transportation Anticipated family or friend will provide   Living Environment Comments Pt lives with daughter who makes meals, assists with bathing and LE dressing   Self-Care   Usual Activity Tolerance moderate   Current Activity Tolerance fair   Equipment Currently Used at Home shower chair   Activity/Exercise/Self-Care Comment Pt reports some walker use at baseline, dtr is her PCA   Disability/Function   Walking or Climbing Stairs Difficulty yes   Walking or Climbing Stairs ambulation difficulty, requires equipment   Dressing/Bathing Difficulty yes   Dressing/Bathing bathing difficulty, requires equipment;bathing difficulty, assistance 1 person;dressing difficulty, assistance 1 person   Toileting issues no   Fall history within last six months no   Change in Functional Status Since Onset of Current Illness/Injury yes   General Information   Onset of Illness/Injury or Date of Surgery 12/18/20   Referring Physician Rosendo Orozco   Patient/Family Therapy Goal Statement (OT) home    Additional Occupational Profile Info/Pertinent History of Current Problem 75 year old female with a past medical history of CVA with residual left hemiparesis, asthma, hypertension, hyperlipidemia, chronic headaches, and GERD who presented to [facility] ED with nausea, vomiting, and dizziness.  A code stroke was initiated in the emergency department, and patient was found to have evidence of acute CVA.   Existing Precautions/Restrictions fall   Heart Disease Risk Factors High blood pressure;Lack of physical activity;Overweight;Medical history;Age   General Observations and Info Activity order: up with assist   Cognitive Status Examination    Follows Commands 75-90% accuracy   Safety Deficit minimal deficit;insight into deficits/self-awareness;safety precautions awareness   Cognitive Status Comments Pt oriented to person/place, appears generally intact though forgetful;  reports she says some confusing statements that he can't understand. Pt generally following directions 75%, though deficit may be due to language barrier (ex. difficulty following commands for MMT)   Visual Perception   Impact of Vision Impairment on Function (Vision) Pt reports unable to see with L eye at baseline (nearly squinted shut), no changes with R eye   Pain Assessment   Patient Currently in Pain No   Posture   Posture forward head position;protracted shoulders   Range of Motion Comprehensive   Comment, General Range of Motion BUE WFL, though LUE needs increased time for full range, and unable to oppose L thumb/fingers.   Strength Comprehensive (MMT)   Comment, General Manual Muscle Testing (MMT) Assessment Generally 4+5 RUE; LUE 4/5 shoulder/elbow, 3+/5 wrist   Coordination   Upper Extremity Coordination Left UE impaired   Functional Limitations Impaired ability to perform bilateral tasks   Bed Mobility   Bed Mobility scooting/bridging;supine-sit;sit-supine   Scooting/Bridging Gable (Bed Mobility) supervision   Supine-Sit Gable (Bed Mobility) minimum assist (75% patient effort)   Sit-Supine Gable (Bed Mobility) contact guard;supervision;verbal cues   Comment (Bed Mobility) Pain L chest with coming to sit, nursing called who called hospitalist; activity okayed by nursing   Transfers   Transfers toilet transfer;shower transfer;sit-stand transfer   Sit-Stand Transfer   Sit-Stand Gable (Transfers) contact guard;verbal cues;supervision;set up   Assistive Device (Sit-Stand Transfers) walker, standard   Shower Transfer   Shower Transfer Comments pt has walk-in shower with shower seat; dtr assists at baseline   Toilet Transfer   Type (Toilet  Transfer) sit-stand;stand-sit   [place] Level (Toilet Transfer) contact guard;supervision;set up   Assistive Device (Toilet Transfer) walker, standard   Toilet Transfer Comments pt reports indep with toileting at baseline; today needing assist with managing gown and set-up for karin-cares   Balance   Balance Comments good seated; unsteady ambulating in room with 2WW, no LOB   Activities of Daily Living   BADL Assessment/Intervention lower body dressing;grooming;toileting   Lower Body Dressing Assessment/Training   [place] Level (Lower Body Dressing) moderate assist (50% patient effort)   Comment (Lower Body Dressing) dtr assists with socks/shoes at baseline   Grooming Assessment/Training   [place] Level (Grooming) minimum assist (75% patient effort);set up   Toileting   [place] Level (Toileting) set up   Instrumental Activities of Daily Living (IADL)   IADL Comments dtr completes home chores   Clinical Impression   Criteria for Skilled Therapeutic Interventions Met (OT) yes;meets criteria;skilled treatment is necessary   OT Diagnosis impaired ADLs   OT Problem List-Impairments impacting ADL problems related to;activity tolerance impaired;balance;coordination;range of motion (ROM);strength   Assessment of Occupational Performance 3-5 Performance Deficits   Identified Performance Deficits dressing, bathing, toileting   Planned Therapy Interventions (OT) ADL retraining;transfer training;strengthening   Clinical Decision Making Complexity (OT) moderate complexity   Therapy Frequency (OT) Daily   Predicted Duration of Therapy 3 days   Risks and Benefits of Treatment have been explained. Yes   Patient, Family & other staff in agreement with plan of care Yes   OT Discharge Planning    OT Discharge Recommendation (DC Rec) home with home care occupational therapy;Home with assist   OT Rationale for DC Rec Pt needing Jose with ADLs, would benefit from continued therapy to increase safety and independence  with ADLs. Home therapies recommended as needing Ax1 to leave the home.   Total Evaluation Time (Minutes)   Total Evaluation Time (Minutes) 8

## 2020-12-19 NOTE — PLAN OF CARE
A/o x3, disoriented to time, anxious at times, Ukrainian speaking using jabber to interpret. AVSS ex HTN on RA. Tele SR. neuros unchanged, L side hemiparesis, L eye absent, R eye diluted, fixed, tracking left to right. C/o headache, tylenol given x1. LS clear. Assist x1, GB/walker to BR, voiding adeuqately. Regular diet, tolerating well. Plan for stress echo tomorrow, will continue to monitor.

## 2020-12-19 NOTE — PROGRESS NOTES
Cross cover    Paged for K of 3.2, no replacement ordered.  Ordered replacement protocol .     ADDENDUM:  Paged by PharmD regarding Neurology note stating continue home Aggrenox, but did not order.  Stopped baby aspirin and resumed home aggrenox for tonight.

## 2020-12-20 ENCOUNTER — APPOINTMENT (OUTPATIENT)
Dept: INTERPRETER SERVICES | Facility: CLINIC | Age: 75
End: 2020-12-20
Payer: COMMERCIAL

## 2020-12-20 PROCEDURE — 250N000013 HC RX MED GY IP 250 OP 250 PS 637: Performed by: STUDENT IN AN ORGANIZED HEALTH CARE EDUCATION/TRAINING PROGRAM

## 2020-12-20 PROCEDURE — 99232 SBSQ HOSP IP/OBS MODERATE 35: CPT | Performed by: INTERNAL MEDICINE

## 2020-12-20 PROCEDURE — 250N000013 HC RX MED GY IP 250 OP 250 PS 637: Performed by: INTERNAL MEDICINE

## 2020-12-20 PROCEDURE — 250N000013 HC RX MED GY IP 250 OP 250 PS 637: Performed by: PHYSICIAN ASSISTANT

## 2020-12-20 PROCEDURE — 120N000001 HC R&B MED SURG/OB

## 2020-12-20 RX ORDER — LISINOPRIL 20 MG/1
20 TABLET ORAL DAILY
Status: DISCONTINUED | OUTPATIENT
Start: 2020-12-20 | End: 2020-12-20

## 2020-12-20 RX ORDER — LISINOPRIL 10 MG/1
10 TABLET ORAL 2 TIMES DAILY
Status: DISCONTINUED | OUTPATIENT
Start: 2020-12-20 | End: 2020-12-21 | Stop reason: HOSPADM

## 2020-12-20 RX ORDER — AMOXICILLIN 250 MG
2 CAPSULE ORAL 2 TIMES DAILY
Status: DISCONTINUED | OUTPATIENT
Start: 2020-12-20 | End: 2020-12-21 | Stop reason: HOSPADM

## 2020-12-20 RX ORDER — POLYETHYLENE GLYCOL 3350 17 G/17G
17 POWDER, FOR SOLUTION ORAL DAILY
Status: DISCONTINUED | OUTPATIENT
Start: 2020-12-21 | End: 2020-12-20

## 2020-12-20 RX ORDER — AMOXICILLIN 250 MG
1 CAPSULE ORAL 2 TIMES DAILY
Status: DISCONTINUED | OUTPATIENT
Start: 2020-12-20 | End: 2020-12-21 | Stop reason: HOSPADM

## 2020-12-20 RX ORDER — ATORVASTATIN CALCIUM 40 MG/1
40 TABLET, FILM COATED ORAL EVERY EVENING
Status: DISCONTINUED | OUTPATIENT
Start: 2020-12-20 | End: 2020-12-21 | Stop reason: HOSPADM

## 2020-12-20 RX ORDER — BISACODYL 10 MG
10 SUPPOSITORY, RECTAL RECTAL DAILY PRN
Status: DISCONTINUED | OUTPATIENT
Start: 2020-12-20 | End: 2020-12-21 | Stop reason: HOSPADM

## 2020-12-20 RX ORDER — POLYETHYLENE GLYCOL 3350 17 G/17G
17 POWDER, FOR SOLUTION ORAL DAILY
Status: DISCONTINUED | OUTPATIENT
Start: 2020-12-20 | End: 2020-12-21 | Stop reason: HOSPADM

## 2020-12-20 RX ADMIN — PREDNISOLONE ACETATE 1 DROP: 10 SUSPENSION/ DROPS OPHTHALMIC at 08:19

## 2020-12-20 RX ADMIN — TICAGRELOR 90 MG: 90 TABLET ORAL at 08:18

## 2020-12-20 RX ADMIN — LISINOPRIL 10 MG: 10 TABLET ORAL at 20:15

## 2020-12-20 RX ADMIN — Medication 1000 UNITS: at 08:18

## 2020-12-20 RX ADMIN — OLOPATADINE HYDROCHLORIDE 1 DROP: 2 SOLUTION/ DROPS OPHTHALMIC at 08:19

## 2020-12-20 RX ADMIN — ATORVASTATIN CALCIUM 40 MG: 40 TABLET, FILM COATED ORAL at 20:15

## 2020-12-20 RX ADMIN — POLYETHYLENE GLYCOL 3350 17 G: 17 POWDER, FOR SOLUTION ORAL at 22:05

## 2020-12-20 RX ADMIN — ASPIRIN 81 MG: 81 TABLET, CHEWABLE ORAL at 08:18

## 2020-12-20 RX ADMIN — TICAGRELOR 90 MG: 90 TABLET ORAL at 20:15

## 2020-12-20 RX ADMIN — DOCUSATE SODIUM 50 MG AND SENNOSIDES 8.6 MG 1 TABLET: 8.6; 5 TABLET, FILM COATED ORAL at 22:05

## 2020-12-20 RX ADMIN — ERYTHROMYCIN: 5 OINTMENT OPHTHALMIC at 21:29

## 2020-12-20 RX ADMIN — LISINOPRIL 10 MG: 10 TABLET ORAL at 08:24

## 2020-12-20 RX ADMIN — PREDNISOLONE ACETATE 1 DROP: 10 SUSPENSION/ DROPS OPHTHALMIC at 20:14

## 2020-12-20 ASSESSMENT — ACTIVITIES OF DAILY LIVING (ADL)
ADLS_ACUITY_SCORE: 16
ADLS_ACUITY_SCORE: 18
ADLS_ACUITY_SCORE: 19

## 2020-12-20 NOTE — PROGRESS NOTES
[facility]    Hospitalist Progress Note    Date of Service (when I saw the patient): 12/20/2020    Assessment & Plan   Toshia Vela is a 75 year old female who was admitted on 12/18/2020.  Assessment & Plan     Toshia Vela is a 75 year old female with a past medical history of CVA with residual left hemiparesis, asthma, hypertension, hyperlipidemia, chronic headaches, and GERD who presented to [facility] ED with nausea, vomiting, and dizziness.  A code stroke was initiated in the emergency department, and patient was found to have evidence of acute CVA.  She was transferred to [facility] for further evaluation and monitoring.     Acute CVA in the setting of previous CVA with residual left hemiplegia;  Focal, acute/subacute nonhemorrhagic infarct right parietal lobe near the vertex  Ruled out for COVID-19  * Presents with acute onset of nausea, vomiting, and vertigo-like dizziness.  Presented to the ER and a code stroke was initiated.  Patient was evaluated by Dr. Oh.  Initial vital signs notable for blood pressure 167/86.  Pulse normal at 70.  EKG shows normal sinus rhythm with nonspecific T wave abnormality, no overt ischemia.  Lab work shows a negative troponin, potassium 3.2, glucose 117, creatinine 0.56, WBC 5.1, hemoglobin 13.2, platelet count 189.  COVID-19 swab is negative.  CT aortic survey shows unremarkable thoracic and abdominal aorta.  Lungs are clear.   Patient had numerous findings on brain MRI/MRI including focal, acute/subacute nonhemorrhagic infarct of the right parietal lobe near the vertex.  There may be some mild associated gyral swelling but without other mass-effect.  There is also noted numerous supratentorial and infratentorial infarcts.  MRA shows stenosis in the left MCA bifurcation, without evidence of large vessel occlusion.  There is moderately severe focal stenosis of the P2 segment of the right PCA.  Given the timing of  her symptoms, she was deemed not candidate for tPA.  Was given aspirin, IV fluids, Zofran, and meclizine.  --Complains of intermittent dizziness from the stroke  Neurology consulted and are following  Started her on baby aspirin and Brilinta 90 mg p.o. twice daily to prevent further strokes  Increase Lipitor from 10 mg daily to 40 mg p.o. daily during this hospitalization as patient had 6 strokes so far as per the family  Transthoracic echo done showed ejection fraction of 60 to 65%.  Proximal septal thickening is noted.  There is no thrombus seen in the left ventricle.  Bubble study was negative    Recurrent chest pain;  Patient had chest pain last night twelve-lead EKG was done RRT was called and was negative serial troponins ordered and returned negative  Patient complained of chest pain again this morning while working with physical therapy which only lasted for few minutes and then resolved  Differential includes angina versus pain caused by anxiety during this hospitalization  With her risk factors we will requested cardiology consultation  Cardiology planning on dobutamine stress echo tomorrow    Hypertension  Hyperlipidemia  --Increase Lipitor to 40 mg p.o. daily  --Start lisinopril 10 mg p.o. daily for better blood pressure control blood pressure still running in systolics in the 140s to increase lisinopril to 10 mg p.o. twice daily today  --Does not appear to be on HTN medications PTA  --IV labetalol and hydralazine as needed      Mild intermittent asthma  Lungs clear to auscultation.  Denies shortness of breath or wheezing.  --Resume PTA Advair     Recent eye procedure, and history of left eye enucleation  --Resume PTA eyedrops     GERD  --Resume prior to admission PPI        Asymptomatic COVID-19 testing was performed and is negative as of 12/17.  Low suspicion.        DVT Prophylaxis: Pneumatic Compression Devices  Code Status: Full Code    Disposition: Expected discharge in the next 2days after  neurology and cardiac evaluation complete    Discussed with bedside RN, patient  Greater than 35 minutes were spent in taking care of her today  Xiomy Lay MD  [phone] (P)      Interval History      Botswanan  was used over the iPad to do her exam today  Patient resting comfortably in bed.  Currently denies any chest pain.    Complains of intermittent dizziness from the stroke    -Data reviewed today: I reviewed all new labs and imaging results over the last 24 hours. I personally reviewed no images or EKG's today.    Physical Exam   Temp: 98.4  F (36.9  C) Temp src: Oral BP: (!) 142/70 Pulse: 73   Resp: 16 SpO2: 98 % O2 Device: None (Room air)    Vitals:    12/18/20 1229   Weight: 78 kg (171 lb 15.3 oz)     Vital Signs with Ranges  Temp:  [97.4  F (36.3  C)-98.9  F (37.2  C)] 98.4  F (36.9  C)  Pulse:  [63-73] 73  Resp:  [16-20] 16  BP: (126-142)/(68-74) 142/70  SpO2:  [94 %-99 %] 98 %  I/O last 3 completed shifts:  In: 360 [P.O.:360]  Out: -     Constitutional: Awake, alert, cooperative, no apparent distress  Respiratory: Clear to auscultation bilaterally, no crackles or wheezing  Cardiovascular: Regular rate and rhythm, normal S1 and S2, and no murmur noted  GI: Normal bowel sounds, soft, non-distended, non-tender  Skin/Integumen: No rashes, no cyanosis, no edema  Other:     Medications     - MEDICATION INSTRUCTIONS -       - MEDICATION INSTRUCTIONS -         aspirin  81 mg Oral Daily     atorvastatin  40 mg Oral QPM     erythromycin   Both Eyes At Bedtime     fluticasone-vilanterol  1 puff Inhalation Daily     lisinopril  10 mg Oral BID     olopatadine  1 drop Both Eyes Daily     prednisoLONE acetate  1 drop Right Eye BID     sodium chloride (PF)  3 mL Intracatheter Q8H     ticagrelor  90 mg Oral BID     vitamin D3  1,000 Units Oral Daily       Data   Recent Labs   Lab 12/19/20  1458 12/19/20  0550 12/19/20  0258 12/18/20  2100 12/17/20  1730   WBC 3.5*  --   --   --  5.1   HGB 12.2  --   --   --   13.2   MCV 92  --   --   --  95     --   --   --  189   INR  --   --   --   --  1.00     --   --   --  141   POTASSIUM 3.7  --  4.1 3.4 3.2*   CHLORIDE 112*  --   --   --  107   CO2 25  --   --   --  28   BUN 8  --   --   --  10   CR 0.52  --   --   --  0.56   ANIONGAP 7  --   --   --  6   ENEIDA 8.9  --   --   --  9.0   GLC 83  --   --   --  117*   TROPI  --  <0.015 <0.015 <0.015 <0.015       No results found for this or any previous visit (from the past 24 hour(s)).

## 2020-12-20 NOTE — PLAN OF CARE
"PT-Attempted to see. Patient (via ) refused due to wanting to eat. Educated patient that her breakfast wasn't in the room yet so she could work with therapist until breakfast arrived but patient still not receptive. \"I am dizzy because hungry and afraid I will fall.\"  "

## 2020-12-20 NOTE — PLAN OF CARE
A/O, forgetful and impulsive at times, VSS.  Neuros unchanged w/ baseline L hemiparesis.  Plans for stress test this am d/t c/o intermittent CP throughout admission.  Denies any CP overnight.  Rested between cares.  Up A-1 w/ GB and walker, +void.

## 2020-12-20 NOTE — PROGRESS NOTES
Up in chair eating dinner at this time. VSS. On RA. Fo fever. Denies CP for the entire day. Neuro unchanged. C/o mild headache. Declines PRNs except a black coffee x 2. Pt to transfer to station-73 (Neuro floor0. Report given to the receiving RN. Called and updated family.

## 2020-12-20 NOTE — CONSULTS
[facility]    Cardiology Consultation     Date of Admission:  12/18/2020  Reason for consult: Chest pain    Assessment & Plan     1.  Recurrent chest pain, atypical  2.  Hypertension  3.  Hyperlipidemia  4.  Acute CVA    Plan:  1.  Suspect noncardiac etiology of chest pain given negative ECG, echocardiogram and troponins.  Given her risk factors, will schedule a dobutamine stress echocardiogram tomorrow morning to confirm.  I do not think she will tolerate a treadmill given imbalance from recent stroke.  2.  Stroke management, per neuro team.  Continue on telemetry.  We will continue to follow.  3.  On aspirin, Brilinta, continue atorvastatin and lisinopril.    Kian Hodgson MD    History of Present Illness   Toshia Vela is a 75 year old female who I was asked to see for recurrent chest pain yesterday and today.  Patient has history of hypertension, hyperlipidemia and the interview was conducted with [place] citizen  via the iPad.  She has no chest pain at home but she is not very active and is mostly taking care of by her daughter.  Yesterday she presented to the ED with dizziness, room spinning sensation and imbalance with minimal activities.  She was found to have a stroke at [facility] so she is transferred to [facility] for further evaluation by neurology.  Presentation was se deemed to be too late for TPA.  Yesterday, an RRT was called at 9:30 PM because of crushing left sided and sternal chest pain.  Pain has been left-sided and lower chest without significant radiations, 5 out of 10 intensity and last for about 5 minutes before completely resolving.  Troponin has been negative x3 and echocardiogram showed normal LV function without regional wall motion abnormalities.  ECG showed nonspecific flattening of the T waves in the inferior lateral leads but no other ischemic changes.  She is a non-smoker, nondiabetic and there is no remarkable family history for premature coronary  artery disease.  Here she is currently comfortable vital signs are stable other than mild hypertension that is treated with as needed IV medications.  There are no prior coronary angiograms or stress test.    Past Medical History   Past Medical History:   Diagnosis Date     Chronic headaches      Hyperlipidemia LDL goal <100      Hypertension      Left eye injury     She does not have L eye     Mild intermittent asthma      Osteopenia      Seasonal allergies      Stroke, paralytic (H)      Weakness of left side of body     after stroke        Past Surgical History   Past Surgical History:   Procedure Laterality Date     ENUCLEATION - left eye          Prior to Admission Medications   Prior to Admission Medications   Prescriptions Last Dose Informant Patient Reported? Taking?   Emollient (CETAPHIL) CREA prn  No No   Sig: Use it daily to the dry skin   Patient taking differently: Apply topically daily as needed Use it daily to the dry skin as needed   PRED FORTE 1 % ophthalmic suspension 12/17/2020 at am  Yes Yes   Sig: Place 1 drop into the right eye 2 times daily   aspirin-dipyridamole (AGGRENOX)  MG per 12 hr capsule 12/17/2020 at am  No Yes   Sig: Take 1 capsule by mouth 2 times daily   atorvastatin (LIPITOR) 10 MG tablet Past Week at am  Yes Yes   Sig: Take 10 mg by mouth three times a week M,W,F   calcium carb 1250 mg, 500 mg [place],/vitamin D 200 units (OSCAL WITH D) 500-200 MG-UNIT per tablet 12/17/2020 at am  No Yes   Sig: Take 1 tablet by mouth 2 times daily (with meals)   cholecalciferol (VITAMIN D) 1000 UNIT tablet 12/17/2020 at am  No Yes   Sig: Take 1 tablet by mouth daily.   Patient taking differently: Take 1,000 Units by mouth every morning    erythromycin (ROMYCIN) ophthalmic ointment 12/17/2020 at am  Yes Yes   Sig: Place into the right eye 2 times daily    fluticasone-salmeterol (ADVAIR) 250-50 MCG/DOSE diskus inhaler prn  No No   Sig: Inhale 1 puff into the lungs 2 times daily Need to make  appointment   Patient taking differently: Inhale 1 puff into the lungs 2 times daily as needed Need to make appointment   olopatadine (PATADAY) 0.2 % ophthalmic solution prn  Yes Yes   Sig: Place 1 drop into both eyes daily as needed    omeprazole (PRILOSEC) 20 MG DR capsule prn  Yes No   Sig: Take 20 mg by mouth daily as needed   triamcinolone (KENALOG) 0.1 % cream prn  No No   Sig: Apply sparingly to affected area twice a day   Patient taking differently: Apply topically 2 times daily as needed Apply sparingly to affected area twice a day      Facility-Administered Medications: None     Allergies   No Known Allergies    Social History   I have reviewed this patient's social history and updated it with pertinent information if needed. Toshia Vela  reports that she has never smoked. She has never used smokeless tobacco. She reports that she does not drink alcohol or use drugs.    Family History   I have reviewed this patient's family history and updated it with pertinent information if needed.   No family history on file.    Code Status    Full Code    Review of Systems   12 point review of systems reviewed and as noted in History of Present Illness    Physical Exam   Temp: 98.2  F (36.8  C) Temp src: Oral BP: 127/68 Pulse: 63   Resp: 16 SpO2: 98 % O2 Device: None (Room air)    Vital Signs with Ranges  Temp:  [97.4  F (36.3  C)-98.9  F (37.2  C)] 98.2  F (36.8  C)  Pulse:  [63-85] 63  Resp:  [16-24] 16  BP: (127-155)/(68-83) 127/68  SpO2:  [95 %-100 %] 98 %  171 lbs 15.34 oz    Constitutional: Awake, alert, cooperative, no apparent distress.  Respiratory: Clear to auscultation bilaterally, no crackles or wheezing.  Neck: Did not assess JVP  Cardiovascular: Regular rate and rhythm, normal S1 and S2, and no murmur noted.  Skin: No rashes, no cyanosis  Psychiatric: Alert, oriented to person, place and time, mild anxiety extremities: No lower or upper extremity edema, no clubbing or cyanosis  Vascular: Radial pulses  4+ and symmetric bilaterally    CARDIOLOGY TESTING AND IMAGING:    Transthoracic echocardiogram:   Interpretation Summary     The left ventricle is normal in size. Proximal septal thickening is noted.  Left ventricular systolic function is normal. The visual ejection fraction is  estimated at 60-65%. Left ventricular diastolic function is indeterminate. No  regional wall motion abnormalities noted. There is no thrombus seen in the  left ventricle.  The right ventricle is normal size. The right ventricular systolic function is  normal.  Normal left atrial size. Right atrial size is normal. A contrast injection  (Bubble Study) was performed that was negative for flow across the interatrial  septum.  Trace mitral and tricuspid regurgitation.  No pericardial effusion.  No previous study for comparison.    Data   Most Recent 3 CBC's:  Recent Labs   Lab Test 12/19/20  1458 12/17/20  1730 03/07/14  1149   WBC 3.5* 5.1 3.2*   HGB 12.2 13.2 12.2   MCV 92 95 92    189 230     Most Recent 3 BMP's:  Recent Labs   Lab Test 12/19/20  1458 12/19/20  0258 12/18/20  2100 12/17/20  1730 03/07/14  1149     --   --  141 141   POTASSIUM 3.7 4.1 3.4 3.2* 4.2   CHLORIDE 112*  --   --  107 105   CO2 25  --   --  28 26   BUN 8  --   --  10 14   CR 0.52  --   --  0.56 0.62   ANIONGAP 7  --   --  6 9   ENEIDA 8.9  --   --  9.0 8.2*   GLC 83  --   --  117* 95     Most Recent 3 Troponin's:  Recent Labs   Lab Test 12/19/20  0550 12/19/20  0258 12/18/20  2100   TROPI <0.015 <0.015 <0.015     Most Recent 3 BNP's:No lab results found.  Most Recent Cholesterol Panel:  Recent Labs   Lab Test 12/19/20  1458   CHOL 109   LDL 37   HDL 62   TRIG 51

## 2020-12-20 NOTE — PROGRESS NOTES
[facility]    Cardiology Progress     Date of Admission:  12/18/2020  Reason for Consult: Chest pain     Assessment & Plan     1.  Recurrent chest pain, atypical  2.  Hypertension  3.  Hyperlipidemia  4.  Acute CVA     Plan:  1.  Stress echocardiogram with dobutamine to be done Monday 12/21 since we cannot do medication challenge on weekend. Will continue to follow.   2.  Stroke management, per neuro team.  Continue on telemetry.   3.  On aspirin, Brilinta, continue atorvastatin and lisinopril. Mildly hypertensive.     Kian Hodgson M.D.    Interval History/Subjective:    Doing well today, feels dizzy with movements but has no recurrent CP.     Physical Exam   Temp: 98.4  F (36.9  C) Temp src: Oral BP: (!) 142/70 Pulse: 73   Resp: 16 SpO2: 98 % O2 Device: None (Room air)    Vital Signs with Ranges  Temp:  [97.4  F (36.3  C)-98.9  F (37.2  C)] 98.4  F (36.9  C)  Pulse:  [63-85] 73  Resp:  [16-20] 16  BP: (126-147)/(68-80) 142/70  SpO2:  [94 %-99 %] 98 %  171 lbs 15.34 oz    Constitutional: Awake, alert, cooperative, no apparent distress.  Respiratory: Clear to auscultation bilaterally, no crackles or wheezing.  Neck: Did not assess JVP  Cardiovascular: Regular rate and rhythm, normal S1 and S2, and no murmur noted.  Extremities: No lower or upper extremity edema, no clubbing or cyanosis  Vascular: Radial pulses 4+ and symmetric bilaterally    Data   Most Recent 3 CBC's:  Recent Labs   Lab Test 12/19/20  1458 12/17/20  1730 03/07/14  1149   WBC 3.5* 5.1 3.2*   HGB 12.2 13.2 12.2   MCV 92 95 92    189 230     Most Recent 3 BMP's:  Recent Labs   Lab Test 12/19/20  1458 12/19/20  0258 12/18/20  2100 12/17/20  1730 03/07/14  1149     --   --  141 141   POTASSIUM 3.7 4.1 3.4 3.2* 4.2   CHLORIDE 112*  --   --  107 105   CO2 25  --   --  28 26   BUN 8  --   --  10 14   CR 0.52  --   --  0.56 0.62   ANIONGAP 7  --   --  6 9   ENEIDA 8.9  --   --  9.0 8.2*   GLC 83  --   --  117*  95     Most Recent 3 Troponin's:  Recent Labs   Lab Test 12/19/20  0550 12/19/20  0258 12/18/20  2100   TROPI <0.015 <0.015 <0.015     Most Recent 3 BNP's:No lab results found.  Most Recent Cholesterol Panel:  Recent Labs   Lab Test 12/19/20  1458   CHOL 109   LDL 37   HDL 62   TRIG 51

## 2020-12-20 NOTE — PLAN OF CARE
Bahamian speaking. A&Ox3. VSS on RA. Denies pain. Neuros unchanged: L sided hemiparesis noted. Absent L eye. Up with 1 mj thapa. Cardiology saw this evening. Dobutamine stress test scheduled for tomorrow.

## 2020-12-21 ENCOUNTER — APPOINTMENT (OUTPATIENT)
Dept: NUCLEAR MEDICINE | Facility: CLINIC | Age: 75
DRG: 065 | End: 2020-12-21
Attending: INTERNAL MEDICINE
Payer: COMMERCIAL

## 2020-12-21 ENCOUNTER — PATIENT OUTREACH (OUTPATIENT)
Dept: GERIATRIC MEDICINE | Facility: CLINIC | Age: 75
End: 2020-12-21

## 2020-12-21 VITALS
OXYGEN SATURATION: 99 % | WEIGHT: 171.96 LBS | SYSTOLIC BLOOD PRESSURE: 113 MMHG | TEMPERATURE: 97.8 F | DIASTOLIC BLOOD PRESSURE: 57 MMHG | BODY MASS INDEX: 30.47 KG/M2 | HEART RATE: 70 BPM | RESPIRATION RATE: 16 BRPM | HEIGHT: 63 IN

## 2020-12-21 LAB
CV STRESS MAX HR HE: 95
NUC STRESS EJECTION FRACTION: 77 %
RATE PRESSURE PRODUCT: NORMAL
STRESS ECHO BASELINE DIASTOLIC HE: 65
STRESS ECHO BASELINE HR: 70
STRESS ECHO BASELINE SYSTOLIC BP: 124
STRESS ECHO CALCULATED PERCENT HR: 65 %
STRESS ECHO LAST STRESS DIASTOLIC BP: 62
STRESS ECHO LAST STRESS SYSTOLIC BP: 109
STRESS ECHO TARGET HR: 146

## 2020-12-21 PROCEDURE — 250N000013 HC RX MED GY IP 250 OP 250 PS 637: Performed by: INTERNAL MEDICINE

## 2020-12-21 PROCEDURE — 343N000001 HC RX 343: Performed by: INTERNAL MEDICINE

## 2020-12-21 PROCEDURE — 250N000011 HC RX IP 250 OP 636: Performed by: INTERNAL MEDICINE

## 2020-12-21 PROCEDURE — A9502 TC99M TETROFOSMIN: HCPCS | Performed by: INTERNAL MEDICINE

## 2020-12-21 PROCEDURE — 250N000013 HC RX MED GY IP 250 OP 250 PS 637: Performed by: PHYSICIAN ASSISTANT

## 2020-12-21 PROCEDURE — 93017 CV STRESS TEST TRACING ONLY: CPT

## 2020-12-21 PROCEDURE — 78452 HT MUSCLE IMAGE SPECT MULT: CPT

## 2020-12-21 PROCEDURE — 99231 SBSQ HOSP IP/OBS SF/LOW 25: CPT | Mod: 25 | Performed by: PHYSICIAN ASSISTANT

## 2020-12-21 PROCEDURE — 250N000013 HC RX MED GY IP 250 OP 250 PS 637: Performed by: STUDENT IN AN ORGANIZED HEALTH CARE EDUCATION/TRAINING PROGRAM

## 2020-12-21 PROCEDURE — 93016 CV STRESS TEST SUPVJ ONLY: CPT | Performed by: INTERNAL MEDICINE

## 2020-12-21 PROCEDURE — 99239 HOSP IP/OBS DSCHRG MGMT >30: CPT | Performed by: INTERNAL MEDICINE

## 2020-12-21 PROCEDURE — 93018 CV STRESS TEST I&R ONLY: CPT | Performed by: INTERNAL MEDICINE

## 2020-12-21 PROCEDURE — 78452 HT MUSCLE IMAGE SPECT MULT: CPT | Mod: 26 | Performed by: INTERNAL MEDICINE

## 2020-12-21 PROCEDURE — 999N000154 HC STATISTIC RADIOLOGY XRAY, US, CT, MAR, NM

## 2020-12-21 RX ORDER — ASPIRIN 81 MG/1
81 TABLET, CHEWABLE ORAL DAILY
Qty: 100 TABLET | Refills: 1 | Status: SHIPPED | OUTPATIENT
Start: 2020-12-22 | End: 2021-07-23

## 2020-12-21 RX ORDER — LISINOPRIL 10 MG/1
10 TABLET ORAL 2 TIMES DAILY
Qty: 60 TABLET | Refills: 0 | Status: SHIPPED | OUTPATIENT
Start: 2020-12-21 | End: 2021-07-23

## 2020-12-21 RX ORDER — CAFFEINE CITRATE 20 MG/ML
60 SOLUTION INTRAVENOUS
Status: DISCONTINUED | OUTPATIENT
Start: 2020-12-21 | End: 2020-12-21

## 2020-12-21 RX ORDER — ACYCLOVIR 200 MG/1
0-1 CAPSULE ORAL
Status: DISCONTINUED | OUTPATIENT
Start: 2020-12-21 | End: 2020-12-21

## 2020-12-21 RX ORDER — REGADENOSON 0.08 MG/ML
0.4 INJECTION, SOLUTION INTRAVENOUS ONCE
Status: DISCONTINUED | OUTPATIENT
Start: 2020-12-21 | End: 2020-12-21

## 2020-12-21 RX ORDER — ATORVASTATIN CALCIUM 40 MG/1
40 TABLET, FILM COATED ORAL EVERY EVENING
Qty: 30 TABLET | Refills: 0 | Status: SHIPPED | OUTPATIENT
Start: 2020-12-21 | End: 2021-07-23

## 2020-12-21 RX ORDER — ALBUTEROL SULFATE 90 UG/1
2 AEROSOL, METERED RESPIRATORY (INHALATION) EVERY 5 MIN PRN
Status: DISCONTINUED | OUTPATIENT
Start: 2020-12-21 | End: 2020-12-21

## 2020-12-21 RX ORDER — AMINOPHYLLINE 25 MG/ML
50-100 INJECTION, SOLUTION INTRAVENOUS
Status: DISCONTINUED | OUTPATIENT
Start: 2020-12-21 | End: 2020-12-21

## 2020-12-21 RX ADMIN — TICAGRELOR 90 MG: 90 TABLET ORAL at 08:35

## 2020-12-21 RX ADMIN — TETROFOSMIN 29 MCI.: 1.38 INJECTION, POWDER, LYOPHILIZED, FOR SOLUTION INTRAVENOUS at 12:46

## 2020-12-21 RX ADMIN — POLYETHYLENE GLYCOL 3350 17 G: 17 POWDER, FOR SOLUTION ORAL at 08:32

## 2020-12-21 RX ADMIN — OLOPATADINE HYDROCHLORIDE 1 DROP: 2 SOLUTION/ DROPS OPHTHALMIC at 08:35

## 2020-12-21 RX ADMIN — REGADENOSON 0.4 MG: 0.08 INJECTION, SOLUTION INTRAVENOUS at 12:38

## 2020-12-21 RX ADMIN — Medication 1000 UNITS: at 08:35

## 2020-12-21 RX ADMIN — ACETAMINOPHEN 650 MG: 325 TABLET, FILM COATED ORAL at 13:46

## 2020-12-21 RX ADMIN — DOCUSATE SODIUM 50 MG AND SENNOSIDES 8.6 MG 2 TABLET: 8.6; 5 TABLET, FILM COATED ORAL at 08:34

## 2020-12-21 RX ADMIN — ASPIRIN 81 MG: 81 TABLET, CHEWABLE ORAL at 08:35

## 2020-12-21 RX ADMIN — FLUTICASONE FUROATE AND VILANTEROL TRIFENATATE 1 PUFF: 200; 25 POWDER RESPIRATORY (INHALATION) at 08:34

## 2020-12-21 RX ADMIN — PREDNISOLONE ACETATE 1 DROP: 10 SUSPENSION/ DROPS OPHTHALMIC at 08:35

## 2020-12-21 RX ADMIN — TETROFOSMIN 10.5 MCI.: 1.38 INJECTION, POWDER, LYOPHILIZED, FOR SOLUTION INTRAVENOUS at 10:09

## 2020-12-21 RX ADMIN — LISINOPRIL 10 MG: 10 TABLET ORAL at 08:34

## 2020-12-21 ASSESSMENT — ACTIVITIES OF DAILY LIVING (ADL)
ADLS_ACUITY_SCORE: 19

## 2020-12-21 NOTE — PROGRESS NOTES
CROSS COVER:    Paged regarding family requesting bowel medications to be ordered and started this evening.    --Miralax daily.    --Senokot BID.      Misael Rebolledo PA-C

## 2020-12-21 NOTE — PLAN OF CARE
Pt here with stroke. Kyrgyz speaking, prabhu used. Disoriented to hospital & day. Neuros - LUE 4/5 strength, no L eye, R eye 4 mm & fixed, intermittent dizziness. VSS. Tele NSR. Regular diet with no pork. Takes pills whole. Up with A1, GB, W. Voiding via BR. Denies pain. Pt scoring green on the Aggression Stop Light Tool. Family very upset that shower & bowel meds were not given as requested on 12/20 during the day - bowel meds ordered and given @ HS, shower offered & declined at 2100. When patient (and family) were offered a plan to shower at 0600 they both asked to not wake the patient if she was sleeping, but would shower if she was awake. Patient was checked x3 from [id number] and was sleeping throughout. Patient was explained that if she did not shower in the early AM, a plan to shower later in the day would be made - patient was accepting of this. Plan for stress echo today & shower. Discharge pending.

## 2020-12-21 NOTE — PROGRESS NOTES
Writer spoke to Pt's daughter Diamond, regarding patient's care and frustrations regarding Pt being unable to shower today and lack of bowel meds after they were requested by family.     Bedside RN offered Pt shower around 2100 tonight after complaints were heard. Pt and Pt's daughter refused showered at this time. A timed shower for morning was offered to the daughter who stated that 0600 was to early and not to wake patient if she was sleeping and set up a time later in the day to shower. Per Pt, she would be okay with a 0600 shower. Writer stated that she would update bedside RN on these requests and pass shower request on to next shift if unable to complete before 0700.    Daughter explained that this is the patients 4th stroke and in her first stroke she lost all her speech and had to relearn Burkinan, her primary language, so there are some words/phrases she uses that the family can understand that the Jabber  might not be interpreting correctly. Due to this, Family would like to be called if possible when Jabber is being used for translation to ensure accuracy.     Daughter Diamond would also like to be called at meal times to assist Pt with ordering meals.     In regard to the bowel meds Diamond requested for patient's constipation, Hospitalist was paged by bedside RN and an orders for Bowel protocol were ordered including senna and miralax. Senna and Miralax were given by bedside RN.    Daughter requested Patient Relations involvement. The number and email address was provided for Patient Relations. At the end of the call, Writer asked if there was any additional information that would be helpful to provide the best care. Daughter stated that there was not anything she can think to add at this time and that all her questions had been answered.

## 2020-12-21 NOTE — PROGRESS NOTES
Notified at 1547 that patient is discharging to home tonight and needs HHC.  She lives with her family.  Contacted her daughter Diamond and another daughter was on the call.  Per her daughters, her PCP was at the [facility] in [place] but has left the clinic and she was going to be set up with a different provider.  I let them know I would set up an appointment.  I also reviewed the recommendation for skilled home care services.  They said she has PCA.  I stated the home care services ordered are skilled services that need to be provided by an agency that provides those services.  They agreed to [facility].  Daughter was also asking for hospital bed, WC and BSC.  I stated I would need to check with the hospitalist Dr. Lay about that.    Referral has been sent to [facility] by .    PCP follow up scheduled.  Updated [facility] about this appointment as well.  Contacted Dr. Lay regarding the family's request for equipment.  Dr. Lay stated they would need to get orders from patient's PCP for this.  Contacted the daughter about this and she was very upset and stated if they could not get the equipment their mother could not discharge.  Passed this information onto bedside RN and charge RN.    Alee Barreto RN, BSN, PHN  Inpatient Care Coordination  [facility]  Phone: [phone]

## 2020-12-21 NOTE — PROGRESS NOTES
[facility]  Cardiology Progress Note    Date of Service (when I saw the patient): 12/21/2020         Interval History:   No recurrent chest pain.     ----------------------------------------------------------------------------------------    Assessment:  Toshia Vela is a 75 year old female who was admitted on 12/18/2020 with acute CVA.     Atypical CP   -- lexiscan nuclear scan today was negative     Acute CVA   -- per neuro team     HTN   -- lisinopril started 12/20    HLD  -- atorvastatin increased to 40 mg    ----------------------------------------------------------------------------------------    Plan:  -- no new recommendations from cardiology; reviewed stress test results with patient via  phone   -- pt OK for discharge from cardiac standpoint    ----------------------------------------------------------------------------------------  Physical Exam   Temp: 98  F (36.7  C) Temp src: Axillary BP: 110/51 Pulse: 84   Resp: 16 SpO2: 99 % O2 Device: None (Room air)    Vitals:    12/18/20 1229   Weight: 78 kg (171 lb 15.3 oz)     GEN:  NAD. Oxygen on room air.   HEENT: Mucous membranes moist.  NECK:  No JVD.  C/V:  Regular rate and rhythm, no murmur, rub or gallop.   RESP: Respirations are unlabored. No use of accessory muscles. Clear to auscultation bilaterally without wheezing, rales, or rhonchi.  GI: Abdomen soft, nontender, nondistended.    EXTREM: No pitting LE edema.   PSYCH: Normal affect.  SKIN: Warm and dry.   VASC: 2+ radial and dorsalis pedis pulses bilaterally.      Medications     - MEDICATION INSTRUCTIONS -       - MEDICATION INSTRUCTIONS -         aspirin  81 mg Oral Daily     atorvastatin  40 mg Oral QPM     erythromycin   Both Eyes At Bedtime     fluticasone-vilanterol  1 puff Inhalation Daily     lisinopril  10 mg Oral BID     olopatadine  1 drop Both Eyes Daily     polyethylene glycol  17 g Oral Daily     prednisoLONE acetate  1 drop Right Eye BID      senna-docusate  1 tablet Oral BID    Or     senna-docusate  2 tablet Oral BID     sodium chloride (PF)  3 mL Intracatheter Q8H     ticagrelor  90 mg Oral BID     vitamin D3  1,000 Units Oral Daily       Data   Reviewed.     Tele: ALF Dale PA-C   12/21/2020  Pager: [phone]

## 2020-12-21 NOTE — DISCHARGE INSTRUCTIONS
HOMECARE NOTE:   Your doctor has ordered home care to help you after your hospital stay.  The staff will contact you to schedule your first visit.  This service will be provided by [facility].  If you have any question, or have not received a call within 48 hours of discharge, please call them at [phone] or [phone].  *please see homecare quality ratings for all homecares in your area at www.medicare.gov     Your risk factors for stroke or TIA (transient ischemic attack):    Your Risk Factors Your Results Normal Ranges   High blood pressure BP Readings from Last 1 Encounters:   12/21/20 113/57    Less than 120/80   Cholesterol              Total Lab Results   Component Value Date    CHOL 109 12/19/2020      Less than 150    Triglycerides   Lab Results   Component Value Date    TRIG 51 12/19/2020    Less than 150   LDL Lab Results   Component Value Date    LDL 37 12/19/2020       Less than 70   HDL Lab Results   Component Value Date    HDL 62 12/19/2020            Greater than 40 (men)  Greater than 50 (women)   Diabetes Recent Labs   Lab 12/19/20  1458   GLC 83    Fasting blood glucose    Smoking/tobacco use  Quit smoking and tobacco   Overweight  Lose 1-2 pounds a week   Lack of exercise  30 minutes moderate activity each day   Other risk factors include carotid (neck) artery disease, atrial fibrillation and stress. You may be on new medicine to treat high blood pressure, cholesterol, diabetes or atrial fibrillation.    Understanding Stroke Booklet given to patient. Please refer to booklet for further information.    Stroke warning signs and symptoms - CALL 911 right away for:  - Sudden numbness or weakness in the face, arm or leg (often on one side of the body).  - Sudden confusion or trouble understanding what is going on.  - Sudden blurred or decreased vision in one or both eyes.  - Sudden trouble speaking, loss of balance, dizziness or problems with coordination.  - Sudden,  severe headache for no reason.  - Fainting or seizures.  - Symptoms may go away then come back suddenly.

## 2020-12-21 NOTE — PROGRESS NOTES
Care Suites Procedure Nursing Note    Patient Information  Name: Toshia Vela  Age: 75 year old    Procedure  Procedure: Lexiscan stress test.  Procedure start time: 1230  Procedure complete time: 1253  Concerns/abnormal assessment: Jabber  used throughout. Rt IV swollen and leaked with flush. DC'd and restarted in lt arm. NM notified of this. Pt stated pain in her heart prior to procedure rated at 6/10. Pt stated she felt pain after injection. Difficult to assess if worse then before injection DT communication.  1256 Pt states pain at 5/10. CO HA.   If abnormal assessment, provider notified: N/A  Plan/Other: return to room per wc.    Jag Phoenix RN

## 2020-12-21 NOTE — PROGRESS NOTES
Nursing shift note  Transfer from  at 1800. Pt here with R parietal stroke. Hx of strokes with residual left sided weakness. Filipino speaking, jabber used. Alert, appears oriented x4. Follows most commands but complex LIZETTE r/t language barrier. VSS on RA. Tele NSR. Regular diet. Voided x1 with A1/gb/walker. Denies any chest pain or HA. Plan for stress echo tomorrow.     Behavior & Aggression Tool color:Green       Pt's belongings:  Belongings on transfer, cell phone, clothing, wallet and glasses in white bag    Home medications:N

## 2020-12-21 NOTE — PHARMACY-RX INSURANCE COVERAGE
Antiplatelet coverage check.  Patient has Medicare D + Medical Assistance through [employer].    Brilinta $0/mo   Prasugrel $0/mo   Clopidogrel $0/mo       -CHAR Padilla, Pharmacy Technician/Liaison, Discharge Pharmacy [phone]

## 2020-12-22 ENCOUNTER — TELEPHONE (OUTPATIENT)
Dept: INTERNAL MEDICINE | Facility: CLINIC | Age: 75
End: 2020-12-22

## 2020-12-22 LAB — INTERPRETATION ECG - MUSE: NORMAL

## 2020-12-22 NOTE — PLAN OF CARE
Physical Therapy Discharge Summary    Reason for therapy discharge:    Discharged to home with home therapy.    Progress towards therapy goal(s). See goals on Care Plan in [facility] electronic health record for goal details.  Goals not met.  Barriers to achieving goals:   limited tolerance for therapy and discharge from facility.    Therapy recommendation(s):    Continued therapy is recommended.  Rationale/Recommendations:    Home PT/OT to maximize (I) with ADL, IADL and mobility..

## 2020-12-22 NOTE — DISCHARGE SUMMARY
[facility]  Discharge Summary        Toshia Vela MRN# [id number]   YOB: 1945 Age: 75 year old     Date of Admission:  12/18/2020  Date of Discharge:  12/21/2020  Admitting Physician:  Daisha Allen MD  Discharge Physician: Xiomy Lay MD  Discharging Service: Hospitalist     Primary Provider: [place]  Primary Care Physician Phone Number: [phone]         Discharge Diagnoses/Problem Oriented Hospital Course (Providers):    Toshia Vela was admitted on 12/18/2020 by Daisha Allen MD and I would refer you to their history and physical.  The following problems were addressed during her hospitalization:  Assessment & Plan     Toshia Vela is a 75 year old female who was admitted on 12/18/2020.  Assessment & Plan     Toshia Vela is a 75 year old female with a past medical history of CVA with residual left hemiparesis, asthma, hypertension, hyperlipidemia, chronic headaches, and GERD who presented to [facility] ED with nausea, vomiting, and dizziness.  A code stroke was initiated in the emergency department, and patient was found to have evidence of acute CVA.  She was transferred to [facility] for further evaluation and monitoring.     Acute CVA in the setting of previous CVA with residual left hemiplegia;  Focal, acute/subacute nonhemorrhagic infarct right parietal lobe near the vertex  Ruled out for COVID-19  * Presents with acute onset of nausea, vomiting, and vertigo-like dizziness.  Presented to the ER and a code stroke was initiated.  Patient was evaluated by Dr. Oh.  Initial vital signs notable for blood pressure 167/86.  Pulse normal at 70.  EKG shows normal sinus rhythm with nonspecific T wave abnormality, no overt ischemia.  Lab work shows a negative troponin, potassium 3.2, glucose 117, creatinine 0.56, WBC 5.1, hemoglobin 13.2, platelet count 189.  COVID-19 swab is negative.  CT aortic survey shows  unremarkable thoracic and abdominal aorta.  Lungs are clear.   Patient had numerous findings on brain MRI/MRI including focal, acute/subacute nonhemorrhagic infarct of the right parietal lobe near the vertex.  There may be some mild associated gyral swelling but without other mass-effect.  There is also noted numerous supratentorial and infratentorial infarcts.  MRA shows stenosis in the left MCA bifurcation, without evidence of large vessel occlusion.  There is moderately severe focal stenosis of the P2 segment of the right PCA.  Given the timing of her symptoms, she was deemed not candidate for tPA.  Was given aspirin, IV fluids, Zofran, and meclizine.  --Complains of intermittent dizziness from the stroke  Neurology consulted and are following  Started her on baby aspirin and Brilinta 90 mg p.o. twice daily to prevent further strokes life long recommended by Neurology   Increase Lipitor from 10 mg  to 40 mg p.o. daily during this hospitalization as patient had 6 strokes so far as per the family  LDL at 37 , to barron cholesterol at 109, HDL 62, triglycerides 51   Transthoracic echo done showed ejection fraction of 60 to 65%.  Proximal septal thickening is noted.  There is no thrombus seen in the left ventricle.  Bubble study was negative     Recurrent chest pain;  Patient had chest pain last night twelve-lead EKG was done RRT was called and was negative serial troponins ordered and returned negative  Patient complained of chest pain again this morning while working with physical therapy which only lasted for few minutes and then resolved  Differential includes angina versus pain caused by anxiety during this hospitalization  With her risk factors we will requested cardiology consultation  Cardiology consulted   barbra scan stress test done and returned normal.Negative for ischemia      Hypertension  Hyperlipidemia  --Increase Lipitor to 40 mg p.o. daily  --Start lisinopril 10 mg p.o. daily for better blood pressure  control blood pressure still running in systolics in the 140s to increase lisinopril to 10 mg p.o. twice daily   BP well controlled now   Recheck BMP in 1week   --Does not appear to be on HTN medications PTA  --IV labetalol and hydralazine as needed      Mild intermittent asthma  Lungs clear to auscultation.  Denies shortness of breath or wheezing.  --Resume PTA Advair     Recent eye procedure, and history of left eye enucleation  --Resume PTA eyedrops     GERD  --Resume prior to admission PPI        Asymptomatic COVID-19 testing was performed and is negative as of 12/17.  Low suspicion.        DVT Prophylaxis: Pneumatic Compression Devices  Code Status: Full Code     Disposition: home today with family with [facility] services      Discussed with bedside RN, patient and her daughters   Greater than 35 minutes were spent in taking care of her today  Xiomy Lay MD  [phone] (P)              Code Status:      Full Code        Brief Hospital Stay Summary Sent Home With Patient in AVS:        Reason for your hospital stay      stroke                 Important Results:      See below         Pending Results:        Unresulted Labs Ordered in the Past 30 Days of this Admission     No orders found from 11/18/2020 to 12/19/2020.            Discharge Instructions and Follow-Up:      Follow-up Appointments     Follow-up and recommended labs and tests       Follow up with primary care provider, within 7 days for hospital follow-   up.  The following labs/tests are recommended: check BMP in 1week     An appointment has been scheduled with Dr Moraes for December 23 at   2:00 pm, please arrive by 1:45 p.m.  [address] [address]    Phone:  [phone]               Discharge Disposition:      Discharged to home        Discharge Medications:        Discharge Medication List as of 12/21/2020  5:55 PM      START taking these medications    Details    aspirin (ASA) 81 MG chewable tablet Take 1 tablet (81 mg) by mouth daily, Disp-100 tablet, R-1, E-Prescribe      lisinopril (ZESTRIL) 10 MG tablet Take 1 tablet (10 mg) by mouth 2 times daily, Disp-60 tablet, R-0, E-Prescribe      ticagrelor (BRILINTA) 90 MG tablet Take 1 tablet (90 mg) by mouth 2 times daily, Disp-60 tablet, R-0, E-Prescribe         CONTINUE these medications which have CHANGED    Details   atorvastatin (LIPITOR) 40 MG tablet Take 1 tablet (40 mg) by mouth every evening, Disp-30 tablet, R-0, E-Prescribe         CONTINUE these medications which have NOT CHANGED    Details   calcium carb 1250 mg, 500 mg [place],/vitamin D 200 units (OSCAL WITH D) 500-200 MG-UNIT per tablet Take 1 tablet by mouth 2 times daily (with meals), Disp-100 tablet, R-0, Fax      cholecalciferol (VITAMIN D) 1000 UNIT tablet Take 1 tablet by mouth daily., 1,000 Units, Oral, DAILY Starting 10/30/2012, Until Discontinued, Disp-100 tablet, R-3, Fax      Emollient (CETAPHIL) CREA Use it daily to the dry skin, Disp-1 Bottle, R-1, E-Prescribe      erythromycin (ROMYCIN) ophthalmic ointment Place into the right eye 2 times daily Disp-3.5 g, R-0Historical      fluticasone-salmeterol (ADVAIR) 250-50 MCG/DOSE diskus inhaler Inhale 1 puff into the lungs 2 times daily Need to make appointment, Disp-1 Inhaler, R-0, Fax      olopatadine (PATADAY) 0.2 % ophthalmic solution Place 1 drop into both eyes daily as needed , Historical      omeprazole (PRILOSEC) 20 MG DR capsule Take 20 mg by mouth daily as needed, Historical      PRED FORTE 1 % ophthalmic suspension Place 1 drop into the right eye 2 times daily, Historical      triamcinolone (KENALOG) 0.1 % cream Apply sparingly to affected area twice a dayDisp-30 g, [id number]         STOP taking these medications       aspirin-dipyridamole (AGGRENOX)  MG per 12 hr capsule Comments:   Reason for Stopping:                 Allergies:       No Known Allergies        Consultations This  "Hospital Stay:      Consultation during this admission received from cardiology and neurology        Condition and Physical on Discharge:      Discharge condition: Stable   Vitals: Blood pressure 113/57, pulse 70, temperature 97.8  F (36.6  C), temperature source Oral, resp. rate 16, height 1.6 m (5' 2.99\"), weight 78 kg (171 lb 15.3 oz), SpO2 99 %.     Constitutional: Alert, awake, NAD    Lungs: CTA   Cardiovascular: RRR   Abdomen: Soft, NT, ND, BS+   Skin: Warm and dry    Other:          Discharge Time:      Greater than 30 minutes.        Image Results From This Hospital Stay (For Non-EPIC Providers):        Results for orders placed or performed during the hospital encounter of 20   Echocardiogram Complete - Bubble study    Narrative    [id number]  [id number]  [id number]  [id number]^^EVANGELINA^RONNY           [facility]  Echocardiography Laboratory  [address] [place] [address]        Name: RICARDO GAGE  MRN: [id number]  : 1945  Study Date: 2020 02:03 PM  Age: 75 yrs  Gender: Female  Patient Location: [facility]  Reason For Study: CVA  Ordering Physician: EVANGELINA RAMIREZ  Referring Physician: LARA MCFARLAND  Performed By: Prashant Gentile RDCS     BSA: 1.8 m2  Height: 63 in  Weight: 177 lb  HR: 73  BP: 145/73 mmHg  _____________________________________________________________________________  __        Procedure  Complete Portable Bubble Echo Adult. Optison (NDC #4379-1661) given  intravenously.  _____________________________________________________________________________  __        Interpretation Summary     The left ventricle is normal in size. Proximal septal thickening is noted.  Left ventricular systolic function is normal. The visual ejection fraction is  estimated at 60-65%. Left ventricular diastolic function is indeterminate. No  regional wall motion abnormalities noted. There is no thrombus seen in the  left ventricle.  The right ventricle is normal " size. The right ventricular systolic function is  normal.  Normal left atrial size. Right atrial size is normal. A contrast injection  (Bubble Study) was performed that was negative for flow across the interatrial  septum.  Trace mitral and tricuspid regurgitation.  No pericardial effusion.  No previous study for comparison.  _____________________________________________________________________________  __        Left Ventricle  The left ventricle is normal in size. Proximal septal thickening is noted.  Left ventricular systolic function is normal. The visual ejection fraction is  estimated at 60-65%. Left ventricular diastolic function is indeterminate. No  regional wall motion abnormalities noted. There is no thrombus seen in the  left ventricle.     Right Ventricle  The right ventricle is normal size. The right ventricular systolic function is  normal.     Atria  Normal left atrial size. Right atrial size is normal. A contrast injection  (Bubble Study) was performed that was negative for flow across the interatrial  septum.     Mitral Valve  There is trace mitral regurgitation.        Tricuspid Valve  There is trace tricuspid regurgitation. The right ventricular systolic  pressure is approximated at 27.9 mmHg plus the right atrial pressure.     Aortic Valve  The aortic valve is not well visualized. No aortic regurgitation is present.  No aortic stenosis is present.     Pulmonic Valve  There is no pulmonic valvular stenosis.     Vessels  The aortic root is normal size. The inferior vena cava is normal.     Pericardium  There is no pericardial effusion.        Rhythm  Sinus rhythm was noted.  _____________________________________________________________________________  __  MMode/2D Measurements & Calculations  IVSd: 1.1 cm     LVIDd: 3.4 cm  LVIDs: 2.1 cm  LVPWd: 0.95 cm  FS: 40.2 %  LV mass(C)d: 104.6 grams  LV mass(C)dI: 57.0 grams/m2  Ao root diam: 3.0 cm  LA dimension: 3.2 cm  LA/Ao: 1.1  LA Volume (BP): 39.8  ml  LA Volume Index (BP): 21.6 ml/m2  RWT: 0.55           Doppler Measurements & Calculations  MV E max stef: 70.3 cm/sec  MV A max stef: 112.5 cm/sec  MV E/A: 0.62  MV dec time: 0.20 sec  TR max stef: 263.9 cm/sec  TR max P.9 mmHg  E/E' av.2  Lateral E/e': 11.1  Medial E/e': 13.2           _____________________________________________________________________________  __           Report approved by: Kamini Cutler 2020 04:10 PM      NM Lexiscan stress test (nuc card)     Value    Target     Baseline Systolic     Baseline Diastolic BP 65    Last Stress Systolic     Last Stress Diastolic BP 62    Baseline HR 70    Max HR 95    Calculated Percent HR 65    Rate Pressure Product 10,355.0    Left Ventricular EF 77    Narrative       The nuclear stress test is probably negative for inducible myocardial   ischemia. A small fixed basal inferolateral defect is noted. This could be   related to soft tissue attenuation although a small, non transmural scar   is not entirely excluded.     Left ventricular function is hyperdynamic.     The left ventricular ejection fraction at stress is 77%.     There is no prior study for comparison.               Most Recent Lab Results In EPIC (For Non-EPIC Providers):    Most Recent 3 CBC's:  Recent Labs   Lab Test 20  1458 20  1730 14  1149   WBC 3.5* 5.1 3.2*   HGB 12.2 13.2 12.2   MCV 92 95 92    189 230      Most Recent 3 BMP's:  Recent Labs   Lab Test 20  1458 20  0258 20  2100 20  1730 14  1149     --   --  141 141   POTASSIUM 3.7 4.1 3.4 3.2* 4.2   CHLORIDE 112*  --   --  107 105   CO2 25  --   --  28 26   BUN 8  --   --  10 14   CR 0.52  --   --  0.56 0.62   ANIONGAP 7  --   --  6 9   ENEIDA 8.9  --   --  9.0 8.2*   GLC 83  --   --  117* 95     Most Recent 3 Troponin's:  Recent Labs   Lab Test 20  0550 20  0258 20  2100   TROPI <0.015 <0.015 <0.015     Most Recent 3  INR's:  Recent Labs   Lab Test 12/17/20  1730 03/07/14  1250   INR 1.00 1.11     Most Recent 2 LFT's:  Recent Labs   Lab Test 03/07/14  1149 06/13/13  0906   AST 18 21   ALT 23 27   ALKPHOS 65 88   BILITOTAL 0.4 0.4     Most Recent Cholesterol Panel:  Recent Labs   Lab Test 12/19/20  1458   CHOL 109   LDL 37   HDL 62   TRIG 51     Most Recent 6 Bacteria Isolates From Any Culture (See EPIC Reports for Culture Details):No lab results found.  Most Recent TSH, T4 and HgbA1c:   Recent Labs   Lab Test 12/19/20  1458   A1C 5.6

## 2020-12-22 NOTE — PLAN OF CARE
Nursing shift note  Pt here with right parietal CVA. A&O to self and situation. Neuros include baseline LUE weakness, left hand contracted, no left eye, and right eye 4mm fixed pupil. VSS. Regular diet, thin liquids. Takes pills whole.  Up with A1 + GB + walker. Denies pain. Pt scoring yellow on the Aggression Stop Light Tool for disorientation and forgetfulness. Patient discharged home at 1920.     Behavior & Aggression Tool color: yellow for disorientation and forgetfulness      Pt's belongings:   Belongings from admission sent home with patient at discharge    Home medications: no

## 2020-12-22 NOTE — PLAN OF CARE
Occupational Therapy Discharge Summary    Reason for therapy discharge:    Discharged to home with home therapy.    Progress towards therapy goal(s). See goals on Care Plan in [facility] electronic health record for goal details.  Goals partially met.  Barriers to achieving goals:   discharge from facility.    Therapy recommendation(s):    Continued therapy is recommended.  Rationale/Recommendations:  Home PT/OT to maximize (I) with ADL, IADL and mobility.

## 2020-12-23 ENCOUNTER — OFFICE VISIT - HEALTHEAST (OUTPATIENT)
Dept: INTERNAL MEDICINE | Facility: CLINIC | Age: 75
End: 2020-12-23

## 2020-12-23 ENCOUNTER — RECORDS - HEALTHEAST (OUTPATIENT)
Dept: ADMINISTRATIVE | Facility: OTHER | Age: 75
End: 2020-12-23

## 2020-12-23 ENCOUNTER — HOME CARE/HOSPICE - HEALTHEAST (OUTPATIENT)
Dept: HOME HEALTH SERVICES | Facility: HOME HEALTH | Age: 75
End: 2020-12-23

## 2020-12-23 DIAGNOSIS — Z00.00 HEALTH CARE MAINTENANCE: ICD-10-CM

## 2020-12-23 DIAGNOSIS — I63.9 RIGHT SIDED CEREBRAL HEMISPHERE CEREBROVASCULAR ACCIDENT (CVA) (H): ICD-10-CM

## 2020-12-23 DIAGNOSIS — R13.19 OTHER DYSPHAGIA: ICD-10-CM

## 2020-12-23 DIAGNOSIS — R26.9 GAIT DISORDER: ICD-10-CM

## 2020-12-23 DIAGNOSIS — T17.908A ASPIRATION INTO AIRWAY, INITIAL ENCOUNTER: ICD-10-CM

## 2020-12-23 DIAGNOSIS — R41.3 MEMORY LOSS: ICD-10-CM

## 2020-12-23 DIAGNOSIS — I63.50 CEREBRAL ARTERY OCCLUSION WITH CEREBRAL INFARCTION (H): ICD-10-CM

## 2020-12-23 DIAGNOSIS — H54.8 LEGAL BLINDNESS, AS DEFINED IN USA: ICD-10-CM

## 2020-12-23 DIAGNOSIS — I69.352: ICD-10-CM

## 2020-12-23 DIAGNOSIS — G44.209 TENSION HEADACHE: ICD-10-CM

## 2020-12-23 DIAGNOSIS — E55.9 VITAMIN D DEFICIENCY: ICD-10-CM

## 2020-12-23 NOTE — PROGRESS NOTES
TRANSITIONS OF CARE (JENNI) LOG   JENNI tasks should be completed by the CC within one (1) business day of notification of each transition. Follow up contact with member is required after return to their usual care setting.  Note:  If CC finds out about the transitions fifteen (15) days or more after the member has returned to their usual care setting, no JENNI log is needed. However, the CC should check in with the member to discuss the transition process, any changes needed to the care plan and document it in a case note.    Member Name:  Toshia Vela MCO Name:  [employer]/Health Plan Member ID#: [id number]   Product: [facility] Coordinator Contact:  Terri Francois RN Agency/[facility]/Care System: [facility]   Transition Communication Actions from Care Management Contact   Transition #1   Notification Date: 12/18/20 Transition Date:   12/18/20 Transition From: Home     Is this the member s usual care setting?               yes Transition To: [facility], [facility]   Transition Type:  Unplanned  Reason for Admission/Comments:  C/o nausea, vomit and dizziness - history of CVA     Shared CC contact info, care plan/services with receiving setting--Date completed: 12/21/20    Notified PCP of transition--Date completed:  12/18/20     via  EMR   Transition #2   Transition #3  (if applicable)   Notification Date: 12/21/20         Transition To:  Home  Transition Date: 12/21/20     Transition Type:    Planned  Notified PCP -- Date completed: 12/21/20              Shared CC contact info, care plan/services with receiving setting or, if applicable, home care agency--Date completed:  n/a  *Complete additional tasks below, if this transition is a return to usual care setting.      Comments:   Back to baseline w/some residual weakness        *Complete tasks below when the member is discharging TO their usual care setting within one (1) business day of notification.  For situations where the Care Coordinator is notified of  the discharge prior to the date of discharge, the Care Coordinator must follow up with the member or designated representative to confirm that discharge actually occurred and discuss required JENNI tasks as outlined in the JENNI Instructions.  (This includes situations where it may be a  new  usual care setting for the member. (i.e., a community member who decides upon permanent nursing home placement following hospitalization and rehab).    Date completed: 12/22/20  Communicated with member or their designated representative about the following:  care transition process; about changes to the member s health status; plan of care updates; education about transitions and how to prevent unplanned transitions/readmissions  Four Pillars for Optimal Transition:    Check  Yes  - if the member, family member and/or SNF/facility staff manages the following:    If  No  provide explanation in the comments section.          [x]  Yes     []  No     Does the member have a follow-up appointment scheduled with primary care or specialist? (Mental health hospitalizations--the appt. should be w/in 7 days)   [x]  Yes     []  No     Can the member manage their medications or is there a system in place to manage medications (e.g. home care set-up)?         [x]  Yes     []  No     Can the member verbalize warning signs and symptoms to watch for and how to respond?         [x]  Yes     []  No     Does the member use a Personal Health Care Record?  Check  Yes  if visit summary, discharge summary, and/or healthcare summary are being used as a PHR.                                                                                                                                                                                    [x] Yes      [] No      Have you updated the member s care plan?  If  No  provide explanation in comments.   Comments:  Spoke with DI Fields, writer will increase PCA for 45 days to allow for short-term help while family  looks for a facility that offers 24 hr care.  Terri Francois RN  [facility]  [phone]

## 2020-12-24 ENCOUNTER — COMMUNICATION - HEALTHEAST (OUTPATIENT)
Dept: INTERNAL MEDICINE | Facility: CLINIC | Age: 75
End: 2020-12-24

## 2020-12-24 DIAGNOSIS — I63.50 CEREBRAL ARTERY OCCLUSION WITH CEREBRAL INFARCTION (H): ICD-10-CM

## 2020-12-24 DIAGNOSIS — K21.9 GASTROESOPHAGEAL REFLUX DISEASE WITHOUT ESOPHAGITIS: ICD-10-CM

## 2020-12-24 DIAGNOSIS — E55.9 VITAMIN D DEFICIENCY: ICD-10-CM

## 2020-12-27 ENCOUNTER — NURSE TRIAGE (OUTPATIENT)
Dept: NURSING | Facility: CLINIC | Age: 75
End: 2020-12-27

## 2020-12-27 ENCOUNTER — COMMUNICATION - HEALTHEAST (OUTPATIENT)
Dept: SCHEDULING | Facility: CLINIC | Age: 75
End: 2020-12-27

## 2020-12-27 NOTE — TELEPHONE ENCOUNTER
Rufina - [facility], needs orders for start of care.     Wants to start visits tomorrow (Monday) and Tuesday.   Needs start of care for Home Care visits.     Skilled Nurse PT OT Speech and  romi Almaraz will communicate with clinic.     Dr SHEMAR Saenz at [facility].

## 2020-12-28 ENCOUNTER — COMMUNICATION - HEALTHEAST (OUTPATIENT)
Dept: GERIATRIC MEDICINE | Facility: CLINIC | Age: 75
End: 2020-12-28

## 2020-12-29 ENCOUNTER — COMMUNICATION - HEALTHEAST (OUTPATIENT)
Dept: INTERNAL MEDICINE | Facility: CLINIC | Age: 75
End: 2020-12-29

## 2020-12-30 ENCOUNTER — PATIENT OUTREACH (OUTPATIENT)
Dept: GERIATRIC MEDICINE | Facility: CLINIC | Age: 75
End: 2020-12-30

## 2020-12-31 NOTE — PROGRESS NOTES
[facility] Care Coordination Contact    Initially Diamond (member's daughter-in-law) was demanding that the health plan provide overnight care and if we didn't writer would be held responsible if Toshia wandered off at night. Writer explained that if Toshia needs constant supervision she will need to move to a NH or assisted. Diamond stated that it goes against their culture to place their family in NH or [facility]. Writer asked if the family could take Toshia into their home. Diamond says that is not an option. Writer explained that member is receiving 4 days per week of adult day care (6 hours per day) plus 3.5 hrs per day of PCA plus 5 hrs per week of homemaking. If family wants the PCA at night they need to work that out with the PCA agency. Diamond explained that she wanted the 6 to 8 hours of night-time supervision in addition to what she is already receiving. Writer explained that the member is at the top of her budget and family is expected to care for Toshia or we can help find a place where she has 24/7 supervision. Writer will request a temporary increase to PCA hours of 6.5 per day for 45 days while family search for alternative living arrangements for Toshia.   Terri Francois RN  [facility]  [phone]

## 2021-01-07 ENCOUNTER — COMMUNICATION - HEALTHEAST (OUTPATIENT)
Dept: INTERNAL MEDICINE | Facility: CLINIC | Age: 76
End: 2021-01-07

## 2021-01-07 ENCOUNTER — TELEPHONE (OUTPATIENT)
Dept: CARE COORDINATION | Facility: CLINIC | Age: 76
End: 2021-01-07

## 2021-01-07 ENCOUNTER — TELEPHONE (OUTPATIENT)
Dept: INTERNAL MEDICINE | Facility: CLINIC | Age: 76
End: 2021-01-07

## 2021-01-07 NOTE — TELEPHONE ENCOUNTER
[facility] now requests orders and shares plan of care/discharge summaries for some patients through [employer].  Please REPLY TO THIS MESSAGE OR ROUTE BACK TO THE AUTHOR in order to give authorization for orders when needed.  This is considered a verbal order, you will still receive a faxed copy of orders for signature.  Thank you for your assistance in improving collaboration for our patients.    Pt. Is a high risk for falls and family can use training for safety.    ORDER    PT 1W2 for gait/balance training, assess of right ear and CGer education      [email or web address]  [phone]

## 2021-01-12 ENCOUNTER — COMMUNICATION - HEALTHEAST (OUTPATIENT)
Dept: ADMINISTRATIVE | Facility: CLINIC | Age: 76
End: 2021-01-12

## 2021-01-12 DIAGNOSIS — I63.50 CEREBRAL ARTERY OCCLUSION WITH CEREBRAL INFARCTION (H): ICD-10-CM

## 2021-01-26 ENCOUNTER — DOCUMENTATION ONLY (OUTPATIENT)
Dept: OTHER | Facility: CLINIC | Age: 76
End: 2021-01-26

## 2021-01-27 ENCOUNTER — COMMUNICATION - HEALTHEAST (OUTPATIENT)
Dept: GERIATRIC MEDICINE | Facility: CLINIC | Age: 76
End: 2021-01-27

## 2021-01-28 ENCOUNTER — COMMUNICATION - HEALTHEAST (OUTPATIENT)
Dept: SCHEDULING | Facility: CLINIC | Age: 76
End: 2021-01-28

## 2021-01-28 DIAGNOSIS — E55.9 VITAMIN D DEFICIENCY: ICD-10-CM

## 2021-01-28 DIAGNOSIS — I63.50 CEREBRAL ARTERY OCCLUSION WITH CEREBRAL INFARCTION (H): ICD-10-CM

## 2021-01-28 DIAGNOSIS — Z00.00 HEALTH CARE MAINTENANCE: ICD-10-CM

## 2021-01-28 DIAGNOSIS — K21.9 GASTROESOPHAGEAL REFLUX DISEASE WITHOUT ESOPHAGITIS: ICD-10-CM

## 2021-01-28 DIAGNOSIS — J45.909 UNCOMPLICATED ASTHMA, UNSPECIFIED ASTHMA SEVERITY, UNSPECIFIED WHETHER PERSISTENT: ICD-10-CM

## 2021-01-28 DIAGNOSIS — G44.209 TENSION HEADACHE: ICD-10-CM

## 2021-01-28 DIAGNOSIS — L84 CORNS AND CALLOSITIES: ICD-10-CM

## 2021-01-28 DIAGNOSIS — H54.8 LEGAL BLINDNESS, AS DEFINED IN USA: ICD-10-CM

## 2021-02-01 ENCOUNTER — RECORDS - HEALTHEAST (OUTPATIENT)
Dept: ADMINISTRATIVE | Facility: OTHER | Age: 76
End: 2021-02-01

## 2021-02-16 ENCOUNTER — COMMUNICATION - HEALTHEAST (OUTPATIENT)
Dept: GERIATRIC MEDICINE | Facility: CLINIC | Age: 76
End: 2021-02-16

## 2021-02-18 ENCOUNTER — PATIENT OUTREACH (OUTPATIENT)
Dept: GERIATRIC MEDICINE | Facility: CLINIC | Age: 76
End: 2021-02-18

## 2021-02-18 NOTE — PROGRESS NOTES
[facility] Care Coordination Contact    Called member's daughter, Diamond, regarding available COVID19 vaccine appointment on 2/23/21. Left a message and requested a return call.  RACHEL Geronimo  [facility]  [phone]

## 2021-02-24 ENCOUNTER — COMMUNICATION - HEALTHEAST (OUTPATIENT)
Dept: ADMINISTRATIVE | Facility: CLINIC | Age: 76
End: 2021-02-24

## 2021-03-03 ENCOUNTER — RECORDS - HEALTHEAST (OUTPATIENT)
Dept: ADMINISTRATIVE | Facility: OTHER | Age: 76
End: 2021-03-03

## 2021-03-06 ENCOUNTER — COMMUNICATION - HEALTHEAST (OUTPATIENT)
Dept: INTERNAL MEDICINE | Facility: CLINIC | Age: 76
End: 2021-03-06

## 2021-03-06 DIAGNOSIS — I63.9 CEREBROVASCULAR ACCIDENT (CVA), UNSPECIFIED MECHANISM (H): ICD-10-CM

## 2021-03-18 ENCOUNTER — TELEPHONE (OUTPATIENT)
Dept: NEUROLOGY | Facility: CLINIC | Age: 76
End: 2021-03-18

## 2021-05-27 VITALS — HEART RATE: 73 BPM | SYSTOLIC BLOOD PRESSURE: 115 MMHG | DIASTOLIC BLOOD PRESSURE: 73 MMHG | OXYGEN SATURATION: 99 %

## 2021-05-28 ASSESSMENT — ASTHMA QUESTIONNAIRES: ACT_TOTALSCORE: 14

## 2021-05-28 NOTE — TELEPHONE ENCOUNTER
Both forms received from Diamond by hand and placed in Dr Alexis's  for completion. Daughter would like forms completed, faxed to numbers provided on the forms, and originals mailed back to the home address.

## 2021-05-28 NOTE — TELEPHONE ENCOUNTER
Spoke with Diamond, who stated she doesn't have the form. Diamond provided phone number of [phone] for clinic director of [facility].  [facility].  Please have him fax over form to [phone] so clinic can complete.

## 2021-05-28 NOTE — TELEPHONE ENCOUNTER
Who is calling: The patients daughter  Reason for Call:  The patient is enrolled in adult day care and there was a fax sent to the clinic to be filled out and it wasn't properly filled out the first time, it needs medications and diagnosis in the paper.  Please fill out completely and fax back to [place] or something like that.  Date of last appointment with primary care: 01-19-19  Okay to leave a detailed message: Yes

## 2021-05-28 NOTE — TELEPHONE ENCOUNTER
Who is calling:  daughter  Reason for Call:  Would like to know status of form she dropped off two weeks ago? Also daughter is requesting to know mothers appointment time coming up. Care connection is unable to give her that information as Diamond is not listed under the new form consent to communicate. Did inform daughter to have mother fill that form out with her name however daughter got super upset and started yelling then hung up.

## 2021-05-28 NOTE — TELEPHONE ENCOUNTER
Left pt a message to call me back directly. Pt no showed at the [facility] 5/14 and twice in 2018. Just want to give a polite reminder to please cxl appts 24 hours in advance if they cannot make it.

## 2021-05-28 NOTE — TELEPHONE ENCOUNTER
Attempted to contact [facility]ron[facility] to have them send us the form again for corrections but there is no answer or voicemail option.   Please ask Diamond to fax us the form or have [facility] Oron[facility] fax it to us at [phone] for corrections. The original was mailed to Diamond and we no longer have it.

## 2021-05-28 NOTE — TELEPHONE ENCOUNTER
Patient Returning Call  Reason for call:  [facility]ron[facility] called back.  Information relayed to patient:  Informed of message listed below.  Gilles Oron[facility] states they never received the fax back yet. Please re send to [phone].  Patient has additional questions:  Yes  If YES, what are your questions/concerns:  As above.  Okay to leave a detailed message?: Yes

## 2021-05-28 NOTE — TELEPHONE ENCOUNTER
Left message to Diamond requested form been took care off.     Special diet form faxed to Yoni attn to Dayton Bolanos at [phone]. Adult Day care form faxed to Gilles Cantu at [phone].  Per Diamond requested both forms mail to pt's address. Copy placed in scan.

## 2021-05-29 NOTE — PROGRESS NOTES
[facility] Care Coordination Contact      [facility] Six-Month Telephone Assessment    6 month telephone assessment completed on 5/23/2019.    ER visits: No  Hospitalizations: No  TCU stays: No  Significant health status changes: None reported.   Falls/Injuries: No  ADL/IADL changes: No  Changes in services: No    Caregiver Assessment follow up:  N/A    Goals: See POC in chart for goal progress documentation.      Will see member in 6 months for an annual health risk assessment.   Encouraged member to call CC with any questions or concerns in the meantime.     RACHEL Amato  [facility]  [phone]

## 2021-05-29 NOTE — TELEPHONE ENCOUNTER
RN cannot approve Refill Request    RN can NOT refill this medication med is not covered by policy/route to provider     . Last office visit: 1/2/2019 Rosendo Alexis DO Last Physical: 3/19/2018 Last MTM visit: Visit date not found Last visit same specialty: 1/2/2019 Rosendo Alexis DO.  Next visit within 3 mo: Visit date not found  Next physical within 3 mo: Visit date not found      Usha Walker, Care Connection Triage/Med Refill 6/24/2019    Requested Prescriptions   Pending Prescriptions Disp Refills     cholecalciferol, vitamin D3, (CHOLECALCIFEROL) 1,000 unit tablet 90 tablet 2     Sig: Take 1 tablet (1,000 Units total) by mouth daily.       There is no refill protocol information for this order

## 2021-05-30 NOTE — TELEPHONE ENCOUNTER
Medication Request  Medication name: Vitamin D 1,000 Capsule  Pharmacy Name and Location: [facility]  Reason for request: Pharmacy Comments:  Patient is new to our pharmacy and is requesting this medication.  When did you use medication last?:  Information not provided  Patient offered appointment:  N/a  Okay to leave a detailed message: no

## 2021-05-31 VITALS — BODY MASS INDEX: 28.37 KG/M2 | WEIGHT: 164 LBS

## 2021-06-01 VITALS — WEIGHT: 168.19 LBS | HEIGHT: 63 IN | BODY MASS INDEX: 29.8 KG/M2

## 2021-06-01 NOTE — PROGRESS NOTES
[facility] Care Coordination Contact    Spoke with member's daughter, Diamond, to inform her that I want to schedule an annual assessment but member's MA is inactive right now. Diamond reported that she sent in the MA renewal paperwork towards the end of August. She reported that she have left voicemails for the Financial Worker but have not heard anything. I stated that I will keep checking to see when member is active. Diamond reported that she will be busy this week and next week.    Missy Wetzel [facility]  [phone]    9/20/19  [place] is [place] again.    Called and left a voicemail for Diamond to return call to schedule the annual assessment.     Missy Wetzel [facility]  [phone]      9/24/19  Called and left a voicemail for Diamond to return call regarding scheduling the annual LTCC assessment.     Missy Wetzel [facility]  [phone]    Called adult daughter Diamond to schedule annual HRA home visit. HRA has been scheduled for October 2, 2019 at 11 AM.  Called [employer] and scheduled an  for the home visit.     Missy Wetzel [facility]  [phone]      10/2/19  Received a call from Precious Mosley , Keya, who reported that she was assigned to the case because Bridget was unavailable. Keya reported that member's daughter, Diamond, had spoken with her to cancel the appointment. Keya reported that Diamond plan to inform me as well.    Called Diamond twice but it went straight to voicemail and unable to leave a voicemail because voicemail box is full.    Called Bridget Negron who is the Precious Mosley  that Diamond is requesting for, to asked about her availability. Bridget reported that she had spoken with Diamond who cancelled the appointment because Diamond had a meeting with work. Bridget reported that Diamond wanted to schedule the appointment for Thursday, October 10th at 11 AM. Bridget reported that she's available that day  and time as well.  request sent to Precious Glasgow.     Missy Wetzel [facility]  [phone]    10/4/19  Received a message from Diamond that she is sorry about cancelling the appointment on Wednesday. She stated that she already informed the , Bridget, to reschedule the appointment for Thursday, October 10th at 11 AM.     Missy Wetzel [facility]  [phone]

## 2021-06-02 VITALS — HEIGHT: 63 IN | WEIGHT: 179.7 LBS | BODY MASS INDEX: 31.84 KG/M2

## 2021-06-02 VITALS — HEIGHT: 63 IN | WEIGHT: 174.6 LBS | BODY MASS INDEX: 30.94 KG/M2

## 2021-06-02 NOTE — PROGRESS NOTES
[facility] Care Coordination Contact  [facility] Home Visit Assessment     Home visit for Health Risk Assessment with Toshiasole Vela completed on 10/10/2019    Type of residence:: Apartment - handicap accessible  Current living arrangement:: I live alone     Assessment completed with:: Family, Patient    Current Care Plan  Member currently receiving the following home care services:     Member currently receiving the following community resources: Day Care, Housekeeping/Chore Agency, PCA      Medication Review  Medication reconciliation completed in Epic: YES  Medication set-up & administration: Family/informal caregiver sets up daily  Family caregiver administers medications  Medication Risk Assessment Medication (1 or more, place referral to MTM):  N/A: No risk factors identified  MTM Referral Placed: No: No risk factors idenified    Mental/Behavioral Health   Depression Screening: See PHQ assessment flowsheet.   Mental health DX:: No      Mental Health Diagnosis: No  Mental Health Services: None: No further intervention needed at this time.    Falls Assessment:   Fallen 2 or more times in the past year?: No   Any fall with injury in the past year?: No    ADL/IADL Dependencies:   Dependent ADLs:: Bathing, Dressing, Eating, Grooming, Incontinence, Toileting  Dependent IADLs:: Cleaning, Cooking, Laundry, Shopping, Meal Preparation, Medication Management, Money Management, Transportation, Incontinence    [facility] sponsored benefits: Shared information re: Silver Sneakers/gym memberships, ASA, Calcium +D.    PCA Assessment completed at visit: Yes    Elderly Waiver Eligibility: Yes - will continue on EW    Care Plan & Recommendations: Member is to continue with PCA, homemaking, and adult day services with an increase in adult day services to decrease isolation.     See [facility] for detailed assessment information.    Follow-Up Plan: Member informed of future contact, plan to f/u with member with a 6  month telephone assessment.  Contact information shared with member and family, encouraged member to call with any questions or concerns at any time.    [place] care continuum providers: Please refer to Health Care Home on the [facility] Problem List to view this patient's [facility] Care Plan Summary.    RACHEL Amato  [facility]  [phone]

## 2021-06-02 NOTE — PROGRESS NOTES
[facility] Care Coordination Contact    Unable to complete MMIS entry due to error message stating no [place] segment. [place] and [place] not have the elderly waiver open as of 10/1/19. The last date of EW was 9/30/19.      Called member's daughter-in-law, Diamond, and notified her of the situation that probably when they renewed the MA paperwork for member, the [facility] financial worker forgot to do something on their end. Financial worker's name is Dayton Bolanos. Phone number is [phone]. Fax is [phone]. Case number is [id number].     Called and left a voicemail for the financial worker and informed him of the situation. Requested a return call.     RACHEL Amato  [facility]  [phone]

## 2021-06-02 NOTE — PROGRESS NOTES
[facility] Care Coordination Contact    Received after visit chart from care coordinator.  Completed following tasks: Mailed copy of care plan to client, Updated services in access and Submitted referrals/auths for adult day care and homemaking     and Provider Signature - No POC Shared:  Member indicates that they do not want their POC shared with any EW providers.    Christiana Maldonado  Care Management Specialist  [facility]  [phone]

## 2021-06-02 NOTE — TELEPHONE ENCOUNTER
Refill Approved    Rx renewed per Medication Renewal Policy. Medication was last renewed on 9/9/18.    Usha Walker, Care Connection Triage/Med Refill 10/8/2019     Requested Prescriptions   Pending Prescriptions Disp Refills     atorvastatin (LIPITOR) 10 MG tablet [Pharmacy Med Name: Atorvastatin Calcium Oral Tablet 10 MG] 90 tablet 0     Sig: TAKE ONE TABLET BY MOUTH ONE TIME DAILY       [facility] Refill Protocol (Hmg CoA Reductase Inhibitors) Passed - 10/6/2019  4:47 PM        Passed - PCP or prescribing provider visit in past 12 months      Last office visit with prescriber/PCP: 1/2/2019 Rosendo Alexis DO OR same dept: 1/2/2019 Rosendo Alexis DO OR same specialty: 1/2/2019 Rosendo Alexis DO  Last physical: 3/19/2018 Last MTM visit: Visit date not found   Next visit within 3 mo: Visit date not found  Next physical within 3 mo: Visit date not found  Prescriber OR PCP: Rosendo Alexis DO  Last diagnosis associated with med order: 1. Cerebral artery occlusion with cerebral infarction (H)  - atorvastatin (LIPITOR) 10 MG tablet [Pharmacy Med Name: Atorvastatin Calcium Oral Tablet 10 MG]; TAKE ONE TABLET BY MOUTH ONE TIME DAILY   Dispense: 90 tablet; Refill: 0    If protocol passes may refill for 12 months if within 3 months of last provider visit (or a total of 15 months).

## 2021-06-02 NOTE — PROGRESS NOTES
[facility] Care Coordination Contact    [employer]:  Faxed completed PCA assessment to PCA Agency and mailed copies to member.  Faxed MD Communication to PCP.  Emailed referral form for auth to [employer].    Christiana Maldonado  Care Management Specialist  [facility]  [phone]

## 2021-06-02 NOTE — PROGRESS NOTES
[facility] Care Coordination Contact    Internal CC change effective 11/1/19.  CC Change letter sent.  Delma Vieira  Case Management Specialist  [facility]  [phone]

## 2021-06-03 VITALS
HEART RATE: 66 BPM | DIASTOLIC BLOOD PRESSURE: 84 MMHG | BODY MASS INDEX: 31.73 KG/M2 | HEIGHT: 63 IN | OXYGEN SATURATION: 96 % | SYSTOLIC BLOOD PRESSURE: 122 MMHG | WEIGHT: 179.06 LBS

## 2021-06-03 NOTE — PROGRESS NOTES
[facility] Care Coordination Contact    Received a call from member's daughter-in-law, Diamond, stating that the ADC did not receive the new authorization yet. I stated that I would follow-up directly with the ADC. I informed Diamond that member have a new CC effective 11/1/19. I emailed the new CC's contact information to Diamond at her request.     Spoke with Cate at [facility] and he reported that they have not received the new authorization effective 11/1/19. I informed him that we sent it to [facility] on 10/18. I gave the authorization information to Cate and instructed him to call [employer] Christiana or the new CC Terri if he had any further questions.     RACHEL Amato  [facility]  [phone]

## 2021-06-03 NOTE — TELEPHONE ENCOUNTER
Medication Question or Clarification  Who is calling: Other: daughter in-lawDiamond Consent to communicate is on file  What medication are you calling about? (include dose and sig)     clopidogrel (PLAVIX) 75 mg tablet 90 tablet 3 11/5/2019     Sig - Route: Take 1 tablet (75 mg total) by mouth daily. - Oral      Who prescribed the medication?: Rosendo Alexis, DO  What is your question/concern?: Daughter in-law reports the patient refuses to take this medication. Patient reports this medication makes her feel weak, dizzy and vomits. Reports patient is refusing to take any of her medications. Please advise and call daughter in-law asap!  Pharmacy: [facility] Matheus Ji to leave a detailed message?: Yes  Site CMT - Please call the pharmacy to obtain any additional needed information.

## 2021-06-03 NOTE — TELEPHONE ENCOUNTER
I thought she had been taking this medication a long time without difficulty.  Is this a new symptom?  It seems unlikely that it is from Plavix.  If she feels that she cannot take plavix, the next best we can do is aspirin 325 mg daily.  She was on baby aspirin, had a stroke, but got headaches from Aggrenox.

## 2021-06-03 NOTE — PROGRESS NOTES
Assessment and Plan:       1. Routine general medical examination at a health care facility  Toshia Vela is a 74-year-old woman here with [facility] for annual wellness visit.  Overall, she has been doing well since we last saw her in January.  It seems that the Aggrenox was causing headaches which were very frequent, now headaches are only once every couple weeks at most.  They give her Tylenol if she has a headache now.  She has some ongoing issues with likely mild dementia, seems to have good support at home.  She sometimes repeats questions.  Discussed health directive, advised to fill one out.    2. Screen for colon cancer  I would be hard pressed to put her through a colonoscopy at age 74 with mild dementia.  Recommend instead Cologuard.  Will need to make a decision if it is positive, otherwise if negative should not continue additional colon cancer screening unless symptoms arise that warrant further assessment.  - Cologuard    3. Cerebral artery occlusion with cerebral infarction (H)  Refill medications, doing bit better with Plavix as opposed to Aggrenox, which seem to cause frequent headaches.  - atorvastatin (LIPITOR) 10 MG tablet; Take 1 tablet (10 mg total) by mouth daily.  Dispense: 90 tablet; Refill: 3  - clopidogrel (PLAVIX) 75 mg tablet; Take 1 tablet (75 mg total) by mouth daily.  Dispense: 90 tablet; Refill: 3  - multivitamin (ONCE DAILY) per tablet; Take 1 tablet by mouth daily. Alive Once Daily Womens 50+  Dispense: 90 tablet; Refill: 3  - HM2(CBC w/o Differential)  - Basic Metabolic Panel  - Hepatic Profile    4. Health care maintenance  Refill medication  - calcium-vitamin D (OYSCO 500/D) 500 mg(1,250mg) -200 unit per tablet; Take 1 tablet by mouth 2 (two) times a day with meals.  Dispense: 180 tablet; Refill: 2    5. Vitamin D deficiency  Refill vitamin D  - cholecalciferol, vitamin D3, (CHOLECALCIFEROL) 1,000 unit (25 mcg) tablet; Take 1 tablet (1,000 Units total) by mouth daily.  Dispense:  90 tablet; Refill: 3    6. Ischemic Stroke  See above  - atorvastatin (LIPITOR) 10 MG tablet; Take 1 tablet (10 mg total) by mouth daily.  Dispense: 90 tablet; Refill: 3  - clopidogrel (PLAVIX) 75 mg tablet; Take 1 tablet (75 mg total) by mouth daily.  Dispense: 90 tablet; Refill: 3  - multivitamin (ONCE DAILY) per tablet; Take 1 tablet by mouth daily. Alive Once Daily Womens 50+  Dispense: 90 tablet; Refill: 3  - HM2(CBC w/o Differential)  - Basic Metabolic Panel  - Hepatic Profile    7. Gastroesophageal reflux disease without esophagitis  Refill medication  - omeprazole (PRILOSEC) 20 MG capsule; Take 1 capsule (20 mg total) by mouth daily.  Dispense: 90 capsule; Refill: 3    8. Flu vaccine need  Update flu shot  - Influenza High Dose,Seasonal,PF 65+ Yrs    9. Encounter for screening for lipoid disorders  Check fasting labs  - Lipid [place], FASTING    10. Memory loss  See above    11. Poor balance  Just today she almost lost her balance backwards.  Diamond would like thyroid checked.  I will also add B12 level.  - Thyroid Stimulating Hormone (TSH)  - Vitamin B12    12. Tension headache  Refill medication taken for headache, seems to be well controlled  - acetaminophen (TYLENOL) 500 MG tablet; Take 1 tablet (500 mg total) by mouth every 4 (four) hours as needed for pain.  Dispense: 100 tablet; Refill: 3    13. Uncomplicated asthma, unspecified asthma severity, unspecified whether persistent  Refill Advair.  ACT score is 14, we ran out of time to discuss  - fluticasone propion-salmeterol (ADVAIR DISKUS) 250-50 mcg/dose DISKUS; Inhale 1 puff 2 (two) times a day.  Dispense: 3 each; Refill: 11        The patient's current medical problems were reviewed.    I have had an Advance Directives discussion with the patient.  The following health maintenance schedule was reviewed with the patient and provided in printed form in the after visit summary:   Health Maintenance   Topic Date Due     HEPATITIS C SCREENING  1945      COLONOSCOPY  01/01/1996     ZOSTER VACCINES (2 of 3) 03/28/2008     TD 18+ HE  01/12/2015     MAMMOGRAM  10/31/2018     DXA SCAN  10/31/2018     MEDICARE ANNUAL WELLNESS VISIT  03/19/2019     ASTHMA CONTROL TEST  07/05/2019     FALL RISK ASSESSMENT  11/05/2020     ADVANCE CARE PLANNING  03/19/2023     PNEUMOCOCCAL IMMUNIZATION 65+ LOW/MEDIUM RISK  Completed     INFLUENZA VACCINE RULE BASED  Completed        Subjective:   Chief Complaint: Toshia Vela is an 74 y.o. female here for an Annual Wellness visit.  She has history of stroke, she is legally blind.  HPI: She has been doing well since January her niece Diamond tells me.  She has much reduced headaches, once every couple weeks she might have a headache, give her Tylenol right away, which seems to take care of it.    She needs a refill on most of her medications.    She needs an updated handicap hang tag, which expires July 2020.    She has not had any new issues.  Diamond tells me that her memory continues to be a challenge.  Sometimes she repeats the same question, but beyond that she did not have any major concerns with the memory.  Other family members have expressed more concerns in the past.    She is anxious to have her cholesterol checked today, flu shot updated.  We talked about colon cancer screening.  We both agree that it would be difficult for her to go through with a colonoscopy.    Review of Systems:    Please see above.  The rest of the review of systems are negative for all systems.    Patient Care Team:  Rosendo Alexis DO as PCP - General  Rosendo Alexis DO as Assigned PCP  Terri Francois RN as Lead Care Coordinator (Primary Care - CC)     Patient Active Problem List   Diagnosis     Asthma     Osteoarthritis     Dysphagia     Leukopenia     Ischemic Stroke     Hemiparesis     Legally Blind ([place] Definition) In The Right Eye     Corns     Gastroesophageal reflux disease without esophagitis     Memory loss     Past Medical  History:   Diagnosis Date     Acute ischemic stroke (H) 1999     Asthma      Left hemiparesis (H)      Legally blind      Leukopenia      Osteoarthritis       No past surgical history on file.   Family History   Family history unknown: Yes      Social History     Socioeconomic History     Marital status: Single     Spouse name: Not on file     Number of children: Not on file     Years of education: Not on file     Highest education level: Not on file   Occupational History     Not on file   Social Needs     Financial resource strain: Not on file     Food insecurity:     Worry: Not on file     Inability: Not on file     Transportation needs:     Medical: Not on file     Non-medical: Not on file   Tobacco Use     Smoking status: Never Smoker     Smokeless tobacco: Never Used   Substance and Sexual Activity     Alcohol use: Not on file     Drug use: Not on file     Sexual activity: Not on file   Lifestyle     Physical activity:     Days per week: Not on file     Minutes per session: Not on file     Stress: Not on file   Relationships     Social connections:     Talks on phone: Not on file     Gets together: Not on file     Attends Orthodox service: Not on file     Active member of club or organization: Not on file     Attends meetings of clubs or organizations: Not on file     Relationship status: Not on file     Intimate partner violence:     Fear of current or ex partner: Not on file     Emotionally abused: Not on file     Physically abused: Not on file     Forced sexual activity: Not on file   Other Topics Concern     Not on file   Social History Narrative    She lives with her daughter.      Current Outpatient Medications   Medication Sig Dispense Refill     acetaminophen (TYLENOL) 500 MG tablet Take 1 tablet (500 mg total) by mouth every 4 (four) hours as needed for pain. 100 tablet 3     atorvastatin (LIPITOR) 10 MG tablet Take 1 tablet (10 mg total) by mouth daily. 90 tablet 3     calcium-vitamin D (OYSCO  "500/D) 500 mg(1,250mg) -200 unit per tablet Take 1 tablet by mouth 2 (two) times a day with meals. 180 tablet 2     [id number] (REFRESH OPTIVE ADVANCED) 0.5-1-0.5 % Drop Apply 1 drop to eye 2 (two) times a day.       cholecalciferol, vitamin D3, (CHOLECALCIFEROL) 1,000 unit (25 mcg) tablet Take 1 tablet (1,000 Units total) by mouth daily. 90 tablet 3     clopidogrel (PLAVIX) 75 mg tablet Take 1 tablet (75 mg total) by mouth daily. 90 tablet 3     fluticasone propion-salmeterol (ADVAIR DISKUS) 250-50 mcg/dose DISKUS Inhale 1 puff 2 (two) times a day. 3 each 11     neomycin-polymyxin-dexamethasone (MAXITROL) 3.5mg/mL-10,000 unit/mL-0.1 % ophthalmic suspension Administer to the right eye 3 (three) times a day as needed. Apply a small amount       omeprazole (PRILOSEC) 20 MG capsule Take 1 capsule (20 mg total) by mouth daily. 90 capsule 3     PRED FORTE 1 % ophthalmic suspension        RESTASIS 0.05 % ophthalmic emulsion        multivitamin (ONCE DAILY) per tablet Take 1 tablet by mouth daily. Alive Once Daily Womens 50+ 90 tablet 3     No current facility-administered medications for this visit.       Objective:   Vital Signs:   Visit Vitals  /84 (Patient Site: Left Arm, Patient Position: Sitting, Cuff Size: Adult Regular)   Pulse 66   Ht 5' 3\" (1.6 m)   Wt 179 lb 1 oz (81.2 kg)   SpO2 96%   BMI 31.72 kg/m         VisionScreening:  No exam data present     PHYSICAL EXAM    General: alert, no distress  HEENT: sclerae anicteric, moist oral mucosa  Heart: Regular rate and rhythm, no murmurs.  No pretibial edema.  Warm extremities  Lungs: Clear to auscultation bilat  Gastrointestinal: abdomen is soft, non-tender, non-distended.    Skin: warm/dry, no rashes  Neuro: no gross abnormalities, required some assistance to get onto the table.  Vision not formally assessed given that she is legally blind  Psychiatric: Pleasant affect         Assessment Results 11/5/2019   Activities of Daily Living 5-6 - " Severe functional impairment   Instrumental Activities of Daily Living 5-6 - Severe functional impairment   Get Up and Go Score Less than 12 seconds   Mini Cog Total Score 2   Some recent data might be hidden     A Mini-Cog score of 0-2 suggests the possibility of dementia, score of 3-5 suggests no dementia    Identified Health Risks:     The patient was provided with suggestions to help her develop a healthy physical lifestyle.   The patient reports that she does not have all recommended working emergency equipment available. She was provided with information about emergency preparedness, including smoke detectors.  The patient reports that she has difficulty with activities of daily living. I have asked that the patient make a follow up appointment in 52 weeks where this issue will be further evaluated and addressed.  The patient reports that she has difficulty with instrumental activities of daily living.  She was provided with a list of local organizations that provide support services and advised to make a follow up appointment in 52 weeks to address this further.   The patient was provided with written information regarding signs of hearing loss.  Information on urinary incontinence and treatment options given to patient.  The patient was provided with suggestions to help her develop a healthy emotional lifestyle.   She is at risk for falling and has been provided with information to reduce the risk of falling at home.  Patient's advanced directive was discussed and I have discussed my concerns regarding the patient's wishes.        The patient was provided with appropriate referrals to address her memory problem.

## 2021-06-03 NOTE — TELEPHONE ENCOUNTER
RN cannot approve Refill Request    RN can NOT refill this medication medication not on med list.         Usha Walker, [facility] Triage/Med Refill 11/13/2019    Requested Prescriptions   Pending Prescriptions Disp Refills     aspirin-dipyridamole (AGGRENOX)  mg per 12 hr capsule [Pharmacy Med Name: Aspirin-Dipyridamole ER Oral Capsule Extended Release 12 Hour  MG] 180 capsule 0     Sig: TAKE ONE CAPSULE BY MOUTH TWICE DAILY       Aspirin/Dipyridamole Refill Protocol Passed - 11/13/2019  6:09 PM        Passed - PCP or prescribing provider visit in past 12 months       Last office visit with prescriber/PCP: 1/2/2019 Rosendo Alexis DO OR same dept: 1/2/2019 Rosendo Alexis DO OR same specialty: 1/2/2019 Rosendo Alexis DO  Last physical: 11/5/2019 Last MTM visit: Visit date not found    Next appt within 3 mo: Visit date not found Next physical within 3 mo: Visit date not found  Prescriber OR PCP: Rosendo Alexis DO  Last diagnosis associated with med order: There are no diagnoses linked to this encounter.  If protocol passes may refill for 6 months if within 3 months of last provider visit (or a total of 9 months).

## 2021-06-04 NOTE — TELEPHONE ENCOUNTER
Patient Returning Call    Reason for call:  lmtcb    Information relayed to patient:  Mother stopped plavix medication due to SE and resumed previous med and is now in need of a refill.    Patient has additional questions:  Yes  If YES, what are your questions/concerns:   Medication was stopped and wrong information was relayed incorrectly to PCP at last office visit.    Okay to leave a detailed message?: Yes     Daughter upset due to poor communication and is requesting a call Stat.  Tried to communicate message and she stated it's not acceptable and wants to discus with PCP.  Please call daughter to address med concerns.

## 2021-06-04 NOTE — TELEPHONE ENCOUNTER
Spoke with pt's daughter in law to further clarify.  Daughter in law reports there has been miscommunication with her family, as pt does not live with her anymore.  Reports pt has NEVER taken Plavix.  Reports when it was prescribed back in 1/2/19, pt never took it. First started taking plavix last week, felt weak and ill, and is refusing to take it.     Asked daughter in law if pt would be willing to take 325 mg of aspirin as advised by pcp.  Daughter in law reports pt wants to go back to taking aggrenox.  Advised that this was stopped originally d/t the headaches it caused.  Daughter in law reports pt is able to manage headaches with tylenol, pt would like to go back on aggrenox.

## 2021-06-04 NOTE — TELEPHONE ENCOUNTER
Patient Returning Call  Reason for call:  Diamond daughter in law   The consent to communicate is not valid as it does not have the boxes checked as to what can be shared. This upset the caller and she was advised writer would take the message to the clinic to return the call.    Information relayed to patient:  Patient is requesting to go back on the   Following medication:    aspirin-dipyridamole (AGGRENOX)  mg per 12 hr capsule (Discontinued) 180 capsule 3 3/19/2018 1/2/2019 No   Sig - Route: Take 1 capsule by mouth 2 (two) times a day. - Oral   Sent to pharmacy as: aspirin-dipyridamole (AGGRENOX)  mg per 12 hr capsule     And stated she has stopped taking Plavix due to becoming ill.    Patient has additional questions:  Yes  If YES, what are your questions/concerns:  I need someone to return my call today this is urgent.  Okay to leave a detailed message?: Yes

## 2021-06-04 NOTE — TELEPHONE ENCOUNTER
Left Diamond detailed message relaying pcp message.  Advised Diamond to call back to further discuss.

## 2021-06-04 NOTE — TELEPHONE ENCOUNTER
Spoke with pt's daughter in law and relayed Dr. Martínez's message.  Pt's daughter understanding.

## 2021-06-05 VITALS
OXYGEN SATURATION: 97 % | WEIGHT: 176 LBS | BODY MASS INDEX: 31.18 KG/M2 | HEART RATE: 80 BPM | SYSTOLIC BLOOD PRESSURE: 127 MMHG | DIASTOLIC BLOOD PRESSURE: 82 MMHG

## 2021-06-07 NOTE — PROGRESS NOTES
[facility] Care Coordination Contact    Called member on 4/30/20 to complete six month assessment and left a message requesting a return call.    Terri Francois RN  [facility]  [phone]

## 2021-06-07 NOTE — PROGRESS NOTES
[facility] Care Coordination Contact    Called member on 4/6/20 to complete six month assessment and left a message requesting a return call.    Terri Francois RN  [facility]  [phone]

## 2021-06-07 NOTE — PROGRESS NOTES
[facility] Care Coordination Contact    Called member on 4/2/20 to complete six month assessment and left a message requesting a return call.    Terri Francois RN  [facility]  [phone]

## 2021-06-08 NOTE — TELEPHONE ENCOUNTER
Refill Approved    Rx renewed per Medication Renewal Policy. Medication was last renewed on 12/4/19.    Usha Walker, [facility] Connection Triage/Med Refill 5/7/2020     Requested Prescriptions   Pending Prescriptions Disp Refills     aspirin-dipyridamole (AGGRENOX)  mg per 12 hr capsule [Pharmacy Med Name: Aspirin-Dipyridamole ER Oral Capsule Extended Release 12 Hour  MG] 60 capsule 0     Sig: Take 1 capsule by mouth 2 (two) times a day.       Aspirin/Dipyridamole Refill Protocol Passed - 5/5/2020  7:02 PM        Passed - PCP or prescribing provider visit in past 12 months       Last office visit with prescriber/PCP: Visit date not found OR same dept: Visit date not found OR same specialty: 1/2/2019 Rosendo Alexis DO  Last physical: Visit date not found Last MTM visit: Visit date not found    Next appt within 3 mo: Visit date not found Next physical within 3 mo: Visit date not found  Prescriber OR PCP: Priyanka Martínez MD  Last diagnosis associated with med order: 1. Cerebrovascular accident (CVA), unspecified mechanism (H)  - aspirin-dipyridamole (AGGRENOX)  mg per 12 hr capsule [Pharmacy Med Name: Aspirin-Dipyridamole ER Oral Capsule Extended Release 12 Hour  MG]; Take 1 capsule by mouth 2 (two) times a day.  Dispense: 60 capsule; Refill: 0    If protocol passes may refill for 6 months if within 3 months of last provider visit (or a total of 9 months).

## 2021-06-09 NOTE — PROGRESS NOTES
Subjective findings: Ms. Vela returned to the clinic today with continued complaints of painful    calluses on the bottom of both feet.    OBJECTIVE FINDINGS: Nails bilateral feet are within normal limits this date.    Skin bilaterally warm and intact. There is thick round raised    hyperkeratotic nucleated lesion sub 1st metatarsal head of both feet and    lateral aspect of the right foot. These areas are painful on palpation.    There is no edema, erythema, cellulitis, drainage, or bleeding noted. DP and    PT pulses +1/4 bilaterally. Capillary refill less than 2 seconds    bilaterally. Negative clonus and negative Babinski bilaterally. Range of    motion within normal limits bilaterally. Muscle power +4/5 bilaterally    within all compartments.    ASSESSMENT: Intractable plantar keratoma.    PLAN: Debrided all hyperkeratotic lesions both feet today.She is to return to clinic every 2 months for continued    foot care.

## 2021-06-10 NOTE — PROGRESS NOTES
[facility] Clinic Note    Toshia Vela   72 y.o. female    Date of Visit: 5/25/2017  Chief Complaint   Patient presents with     Headache       ASSESSMENT/PLAN  1. Chronic tension-type headache, intractable     2. Gastroesophageal reflux disease without esophagitis  omeprazole (PRILOSEC) 20 MG capsule   3. Health care maintenance  calcium-vitamin D (OYSCO 500/D) 500 mg(1,250mg) -200 unit per tablet    Comprehensive Metabolic Panel    HM2(CBC w/o Differential)    Lipid [place]   4. Ischemic Stroke  atorvastatin (LIPITOR) 10 MG tablet    aspirin-dipyridamole (AGGRENOX)  mg per 12 hr capsule    HM2(CBC w/o Differential)   5. Vitamin D deficiency  cholecalciferol, vitamin D3, (CHOLECALCIFEROL) 1,000 unit tablet     ---------------------------------------------    Chronic headache  Ms. Vela is experienced what sounds like a tension type headache each day, made better with Tylenol.  Neurologic exam is normal aside from weakness around the left eye from the residual stroke, also probable left visual field cut in the right eye.  Her tension type headache does not sound concerning, and I provided reassurance.  Her daughter emphasized that a lot of [place] woman worry a lot and have stress, which causes headaches, especially worrying about other people's children back in [place] [facility] [place].    --Sent prescription for Tylenol    Healthcare maintenance  History of ischemic stroke ×3, she is fasting, recheck labs today.    Medication refills also sent in.      Return for Annual physical.      SUBJECTIVE  Toshia Vela is a 72-year-old woman who presents for ongoing headache.  3 days feels headache, has light-sensitive dizziness.  This is made better with Tylenol.  Her daughter states that also mildly women have this problem because they worry about other people's children back home in [place] [facility] [place].  Patient herself does not endorse any depression or anxiety.  Pain is described as over the whole head, present every day for many  months now.    ROS A comprehensive review of systems was performed and was otherwise negative    Medications, allergies, and problem list were reviewed and updated    Patient Active Problem List   Diagnosis     Asthma     Osteoarthritis     Difficulty Swallowing (Dysphagia)     Leukopenia     Ischemic Stroke     Hemiparesis     Legally Blind ([place] Definition) In The Right Eye     Corns     Gastroesophageal reflux disease without esophagitis     Past Medical History:   Diagnosis Date     Acute ischemic stroke 1999     Asthma      Left hemiparesis      Legally blind      Leukopenia      Osteoarthritis      No past surgical history on file.  Social History     Social History     Marital status: Single     Spouse name: N/A     Number of children: N/A     Years of education: N/A     Occupational History     Not on file.     Social History Main Topics     Smoking status: Never Smoker     Smokeless tobacco: Not on file     Alcohol use Not on file     Drug use: Not on file     Sexual activity: Not on file     Other Topics Concern     Not on file     Social History Narrative    She lives with her daughter.     Family History   Problem Relation Age of Onset     Family history unknown: Yes       Current Outpatient Prescriptions   Medication Sig Dispense Refill     acetaminophen (TYLENOL EXTRA STRENGTH) 500 MG tablet Take 1 tablet (500 mg total) by mouth every 4 (four) hours as needed for pain. 100 tablet 2     aspirin-dipyridamole (AGGRENOX)  mg per 12 hr capsule Take 1 capsule by mouth 2 (two) times a day. 180 capsule 3     atorvastatin (LIPITOR) 10 MG tablet TAKE 1 TABLET DAILY-Please Schedule Physical Exam & Fasting Labs 90 tablet 3     calcium-vitamin D (OYSCO 500/D) 500 mg(1,250mg) -200 unit per tablet TAKE 1 TABLET BY MOUTH TWICE DAILY WITH MEALS 180 tablet 3     [id number]-dhasfnc-[id number] (REFRESH OPTIVE ADVANCED) 0.5-1-0.5 % Drop Apply 1 drop to eye 2 (two) times a day.       cholecalciferol, vitamin D3,  (CHOLECALCIFEROL) 1,000 unit tablet Take 1 tablet (1,000 Units total) by mouth daily. 90 tablet 3     cholecalciferol, vitamin D3, 1,000 unit tablet 1,000 Units daily.       fluticasone-salmeterol (ADVAIR DISKUS) 250-50 mcg/dose DISKUS Inhale 1 puff 2 (two) times a day. 3 each 11     MULTIVITAMIN (ONCE DAILY ORAL) 1 tablet daily. Alive Once Daily Womens 50+       neomycin-polymyxin-dexamethasone (MAXITROL) 3.5mg/mL-10,000 unit/mL-0.1 % ophthalmic suspension Administer to the right eye 3 (three) times a day as needed. Apply a small amount       omeprazole (PRILOSEC) 20 MG capsule Take 1 capsule (20 mg total) by mouth daily. 90 capsule 3     PRED FORTE 1 % ophthalmic suspension        RESTASIS 0.05 % ophthalmic emulsion        No current facility-administered medications for this visit.        No Known Allergies    EXAM  Vitals:    05/25/17 1243   BP: 118/70   Patient Site: Left Arm   Patient Position: Sitting   Cuff Size: Adult Regular   Pulse: 66   Weight: 164 lb (74.4 kg)     General: Alert, no distress  ENT: Left eye weakness  Neurologic: Cranial nerves II through XII intact, aside from impaired abduction of the right eye, lack of movement of the left eye following stroke.  She has some trouble following commands for visual tracking.  Otherwise, no abnormalities of cranial nerves.  Visual fields and visual acuity were not formally tested.  Musculoskeletal: Neck is supple    RESULTS REVIEWED:   Labs requested today  Review podiatry note from 3/2/17, calluses were debrided.  Data points  3       Rosendo Alexis DO  Internal Medicine  [facility]

## 2021-06-11 NOTE — PROGRESS NOTES
[facility] Care Coordination Contact    Called family Diamond underwood) on 9/29/20 to schedule annual HRA home visit. Left a message requesting a return call to schedule HRA. left a message on home phone and sent a txt to cell phone as mailbox was full.  Terri Francois RN  [facility]  [phone]

## 2021-06-11 NOTE — TELEPHONE ENCOUNTER
Daughter Diamond calls to inform that they need help with care coordination. Reports that Toshia has been anxious lately. She lives alone by herself in a senior apartment. Has been worried a lot.    Diamond states that Toshia has worked with a care coordinator but has left in January, care coordinator was with [facility]. She also states that they may need help with home care for Toshia. FNA advised that this can be discussed during clinic appointment.    Per protocol, advised clinic visit within 3 days. She states she has an appointment tomorrow at the [facility]. Care advice reviewed. Caller verbalizes understanding. Advised to call back with further questions/concerns.         [place] RN/[place] Nurse Advisor    Reason for Disposition    Symptoms of anxiety or panic and has not been evaluated for this by physician    Additional Information    Negative: SEVERE difficulty breathing (e.g., struggling for each breath, speaks in single words)    Negative: Bluish (or gray) lips or face    Negative: Difficult to awaken or acting confused  (e.g., disoriented, slurred speech)    Negative: Hysterical or combative behavior    Negative: Sounds like a life-threatening emergency to the triager    Negative: Difficulty breathing and persists > 10 minutes and not relieved by reassurance provided by triager    Negative: Lightheadedness or dizziness and persists > 10 minutes and not relieved by reassurance provided by triager    Negative: Alcohol or drug abuse, known or suspected, and feeling very shaky (i.e., visible tremors of hands)    Negative: Patient sounds very sick or weak to the triager    Negative: Patient sounds very upset or troubled to the triager    Negative: Symptoms interfere with work or school    Protocols used: ANXIETY AND PANIC ATTACK-A-OH

## 2021-06-11 NOTE — PROGRESS NOTES
Clinic Note    Assessment:     Assessment and Plan:    1. Enrolled in chronic care management  She has some memory loss and chronic anxiety symptoms that benefit with care coordination.  I did see that somebody from the care management team reached out to them earlier today.  She needs establish care with a new PCP.    2. Visit for screening mammogram  - Mammo Screening Bilateral; Future    3.  Chronic headaches  Her chronic headaches are likely due to her Aggrenox medication.  She apparently has some issues with Plavix.  I told her that another option would be moving to full strength aspirin daily.  She and her daughter wish to continue with the Aggrenox and to use Tylenol as needed for headaches.       Patient Instructions   Blood pressure and other vital signs look good today.    Your headaches are due to the Aggrenox medication.  You can use Tylenol for your headaches.    I filled out your nutrition paperwork today.    You need to establish care with a new PCP.  I recommend Dr. Saenz or Danyelle Osorio, CNP    We will work to get records from the [facility].    Return in about 3 months (around 12/29/2020).         Subjective:      Toshia Vela is a 75 y.o. female who comes to the clinic today with her daughter, who is interpreting for the patient.    Patient's PCP has left and she needs a new PCP.  She has some paperwork today to be filled out regarding her nutrition status and overall nutrition needs.  They have concerns about some chronic headaches that she has been experiencing.    Daughter is also requesting that we obtain records from [facility]; apparently patient used to live in [place] and was getting care at [facility] from 2003 until 2011.    Patient is otherwise feeling well today and has no acute complaints.    The following portions of the patient's history were reviewed and updated as appropriate: Allergies, medications, problems, prior note    Review of Systems:    Review is otherwise negative except  for what is mentioned above.     Social Hx:    Social History     Tobacco Use   Smoking Status Never Smoker   Smokeless Tobacco Never Used         Objective:     Vitals:    09/29/20 1539   BP: 127/82   Pulse: 80   SpO2: 97%   Weight: 176 lb (79.8 kg)       Exam:    General: No apparent distress. Calm.  Accompanied by daughter, who is interpreting.      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 100 tablet 3     aspirin-dipyridamole (AGGRENOX)  mg per 12 hr capsule Take 1 capsule by mouth 2 (two) times a day. 180 capsule 1     atorvastatin (LIPITOR) 10 MG tablet Take 1 tablet (10 mg total) by mouth daily. 90 tablet 3     calcium-vitamin D (OYSCO 500/D) 500 mg(1,250mg) -200 unit per tablet Take 1 tablet by mouth 2 (two) times a day with meals. 180 tablet 2     [id number] (REFRESH OPTIVE ADVANCED) 0.5-1-0.5 % Drop Apply 1 drop to eye 2 (two) times a day.       cholecalciferol, vitamin D3, (CHOLECALCIFEROL) 1,000 unit (25 mcg) tablet Take 1 tablet (1,000 Units total) by mouth daily. 90 tablet 3     fluticasone propion-salmeterol (ADVAIR DISKUS) 250-50 mcg/dose DISKUS Inhale 1 puff 2 (two) times a day. 3 each 11     multivitamin (ONCE DAILY) per tablet Take 1 tablet by mouth daily. Alive Once Daily Womens 50+ 90 tablet 3     neomycin-polymyxin-dexamethasone (MAXITROL) 3.5mg/mL-10,000 unit/mL-0.1 % ophthalmic suspension Administer to the right eye 3 (three) times a day as needed. Apply a small amount       omeprazole (PRILOSEC) 20 MG capsule Take 1 capsule (20 mg total) by mouth daily. 90  capsule 3     PRED FORTE 1 % ophthalmic suspension        RESTASIS 0.05 % ophthalmic emulsion        No current facility-administered medications for this visit.          Moises Garcia CNP (Rob)    9/29/2020

## 2021-06-11 NOTE — PROGRESS NOTES
[facility] Care Coordination Contact    Called family Fields (responsible party) on 9/30/20 to schedule annual HRA home visit. Visit has been scheduled for later today.   Terri Francois RN  [facility]  [phone]

## 2021-06-11 NOTE — PATIENT INSTRUCTIONS - HE
Blood pressure and other vital signs look good today.    Your headaches are due to the Aggrenox medication.  You can use Tylenol for your headaches.    I filled out your nutrition paperwork today.    You need to establish care with a new PCP.  I recommend Dr. aSenz or Danyelle Osorio, CNP    We will work to get records from the [facility].

## 2021-06-12 NOTE — TELEPHONE ENCOUNTER
Former patient of Dr JON Alexis & has not established care with another provider.  Please assign refill request to covering provider per Clinic standard process.    RN cannot approve Refill Request    RN can NOT refill this medication med is not covered by policy/route to provider. Last office visit: 1/2/2019 Rosendo Alexis DO Last Physical: 11/5/2019 Last MTM visit: Visit date not found Last visit same specialty: 1/2/2019 Rosendo Alexis DO.  Next visit within 3 mo: Visit date not found  Next physical within 3 mo: Visit date not found      Jammie Zhou, Care Connection Triage/Med Refill 11/4/2020    Requested Prescriptions   Pending Prescriptions Disp Refills     OYSCO 500/D 500 mg(1,250mg) -200 unit per tablet [Pharmacy Med Name: Oysco 500+D Oral Tablet 500-200 MG-UNIT] 180 tablet 0     Sig: Take 1 tablet by mouth 2 times a day with meals.       There is no refill protocol information for this order

## 2021-06-12 NOTE — PROGRESS NOTES
[facility] Care Coordination Contact    Received after visit chart from care coordinator.  Completed following tasks: Mailed copy of care plan to client, Updated services in access and Submitted referrals/auths for ADC & HMK. Mailed list of ADC to member.       Provider Signature - No POC Shared:  Member indicates that they do not want their POC shared with any EW providers.    [facility]:  Faxed completed PCA assessment to [facility] Agency and mailed copies to member.  Faxed MD Communication to PCP.  Emailed referral form for auth to [employer].    Mailed PCA Signature page and POC Signature page to member w/ self-stamped envelope for return.    Sandy Flannery  Care Management Specialist  [facility]  [phone]'

## 2021-06-12 NOTE — PROGRESS NOTES
[facility] Care Coordination Contact  [facility] Home Visit Assessment     Home visit for Health Risk Assessment with Toshia Vela completed on 09/30/2020       Current living arrangement:: I live alone     Assessment completed with:: Other, Patient(Responsible Party (Diamond))    Current Care Plan  Member currently receiving the following home care services:     Member currently receiving the following community resources: Day Care, Housekeeping/Chore Agency, PCA      Medication Review  Medication reconciliation completed in Epic: YES  Medication set-up & administration: Independent-does not set up  Family caregiver administers medications  Medication Risk Assessment Medication (1 or more, place referral to MTM):  N/A: No risk factors identified  MTM Referral Placed: No: No risk factors idenified    Mental/Behavioral Health   Depression Screening:    PHQ-2 Total Score: 0       Mental health DX:: No        Falls Assessment:   Fallen 2 or more times in the past year?: No   Any fall with injury in the past year?: No    ADL/IADL Dependencies:   Dependent ADLs:: Ambulation-cane, Bathing, Dressing, Ambulation-walker, Grooming, Toileting, Eating  Dependent IADLs:: Cleaning, Cooking, Laundry, Shopping, Meal Preparation, Medication Management, Money Management, Transportation    [facility] sponsored benefits: Shared information re: Silver Sneakers/gym memberships, ASA, Calcium +D.    PCA Assessment completed at visit: Yes Annual PCA assessment indicated 14 units per day of PCA. This is the same as the previous assessment.     Elderly Waiver Eligibility: Yes - will continue on EW    Care Plan & Recommendations: Due to COVID restrictions Toshia has not been able to attend adult day care and she is requesting an increase to homemaking to help with meal prep, laundry, homemaking and errands. Will increase to 5 hrs/wk. Toshia has requested a list of adult day care's that are open and hopes to switch.    See LTCC  for detailed assessment information.    Follow-Up Plan: Member informed of future contact, plan to f/u with member with a 6 month telephone assessment.  Contact information shared with member and family, encouraged member to call with any questions or concerns at any time.    [place] care continuum providers: Please refer to Health Care Home on the Epic Problem List to view this patient's [facility] Care Plan Summary.  Terri Francois RN  [facility]  [phone]

## 2021-06-13 NOTE — TELEPHONE ENCOUNTER
RN cannot approve Refill Request    RN can NOT refill this medication PCP messaged that patient is overdue for Office Visit. Last office visit: Visit date not found Last Physical: Visit date not found Last MTM visit: Visit date not found Last visit same specialty: 1/2/2019 Rosendo Alexis DO.  Next visit within 3 mo: Visit date not found  Next physical within 3 mo: Visit date not found      Mercedes Atkinson, Care Connection Triage/Med Refill 11/22/2020    Requested Prescriptions   Pending Prescriptions Disp Refills     aspirin-dipyridamole (AGGRENOX)  mg per 12 hr capsule [Pharmacy Med Name: Aspirin-Dipyridamole ER Oral Capsule Extended Release 12 Hour  MG] 180 capsule 0     Sig: Take 1 capsule by mouth 2 (two) times a day.       Aspirin/Dipyridamole Refill Protocol Failed - 11/22/2020  2:02 AM        Failed - PCP or prescribing provider visit in past 12 months       Last office visit with prescriber/PCP: Visit date not found OR same dept: Visit date not found OR same specialty: 1/2/2019 Rosendo Alexis DO  Last physical: Visit date not found Last MTM visit: Visit date not found    Next appt within 3 mo: Visit date not found Next physical within 3 mo: Visit date not found  Prescriber OR PCP: Priyanka Martínez MD  Last diagnosis associated with med order: 1. Cerebrovascular accident (CVA), unspecified mechanism (H)  - aspirin-dipyridamole (AGGRENOX)  mg per 12 hr capsule [Pharmacy Med Name: Aspirin-Dipyridamole ER Oral Capsule Extended Release 12 Hour  MG]; Take 1 capsule by mouth 2 (two) times a day.  Dispense: 180 capsule; Refill: 0    If protocol passes may refill for 6 months if within 3 months of last provider visit (or a total of 9 months).

## 2021-06-14 NOTE — TELEPHONE ENCOUNTER
ALL MEDICATIONS especially the stroke one 90 mg bicargrelor brilinta.  refill needed.  Pharmacy [facility] in [place] for refills. Daughter Diamond calling. Please call her so she knows the status of this request:  [phone].  She is out of the medications. This is an emergency to them. If she can't get it today she will end up in the ER. Please call her today.  Thank you,  Chhaya Quevedo RN  [facility]    Reason for Disposition    Request for URGENT new prescription or refill of 'essential' medication (i.e., likelihood of harm to patient if not taken) and triager unable to fill per department policy    Additional Information    Negative: Drug overdose and triager unable to answer question    Negative: Caller requesting information unrelated to medicine    Negative: Caller requesting a prescription for Strep throat and has a positive culture result    Negative: Rash while taking a medication or within 3 days of stopping it    Negative: Immunization reaction suspected    Negative: Asthma and having symptoms of asthma (cough, wheezing, etc.)    Negative: Breastfeeding questions about mother's medicines and diet    Negative: MORE THAN A DOUBLE DOSE of a prescription or over-the-counter (OTC) drug    Negative: DOUBLE DOSE (an extra dose or lesser amount) of over-the-counter (OTC) drug and any symptoms (e.g., dizziness, nausea, pain, sleepiness)    Negative: DOUBLE DOSE (an extra dose or lesser amount) of prescription drug and any symptoms (e.g., dizziness, nausea, pain, sleepiness)    Negative: Took another person's prescription drug    Negative: DOUBLE DOSE (an extra dose or lesser amount) of prescription drug and NO symptoms (Exception: a double dose of antibiotics)    Negative: Diabetes drug error or overdose (e.g., took wrong type of insulin or took extra dose)    Negative: Caller has medication question about med not prescribed by PCP and triager unable to answer question (e.g.,  compatibility with other med, storage)    Protocols used: MEDICATION QUESTION CALL-A-OH

## 2021-06-14 NOTE — TELEPHONE ENCOUNTER
Reason for Call:  Home Health Care    April with [facility] called regarding (reason for call): Verbal order    Orders are needed for this patient. OT    PT: na    OT:  RANGE OF MOTION AND DURABLE MEDICAL EQUIPMENT NEEDS  1x week for 3 weeks    Skilled Nursing: na    Pt Provider: Dr Saenz    Phone Number Homecare Nurse can be reached at: [phone]    Can we leave a detailed message on this number? Yes  Please leave name/title    Best Time: any    Call taken on 1/7/2021 at 11:34 AM by Laura L Goldberg

## 2021-06-14 NOTE — TELEPHONE ENCOUNTER
RN cannot approve Refill Request    RN can NOT refill this medication med is not covered by policy/route to provider. Last office visit: 1/2/2019 Rosendo Alexis DO Last Physical: 11/5/2019 Last MTM visit: Visit date not found Last visit same specialty: 12/23/2020 Ange Saenz MD.  Next visit within 3 mo: Visit date not found  Next physical within 3 mo: Visit date not found      Mercedes Atkinson, Care Connection Triage/Med Refill 12/25/2020    Requested Prescriptions   Pending Prescriptions Disp Refills     atorvastatin (LIPITOR) 10 MG tablet [Pharmacy Med Name: Atorvastatin Calcium Oral Tablet 10 MG] 90 tablet 0     Sig: Take 1 tablet by mouth daily.       Statins Refill Protocol (Hmg CoA Reductase Inhibitors) Passed - 12/24/2020 10:11 AM        Passed - PCP or prescribing provider visit in past 12 months      Last office visit with prescriber/PCP: 1/2/2019 Rosendo Alexis DO OR same dept: Visit date not found OR same specialty: 12/23/2020 Ange Saenz MD  Last physical: 11/5/2019 Last MTM visit: Visit date not found   Next visit within 3 mo: Visit date not found  Next physical within 3 mo: Visit date not found  Prescriber OR PCP: Rosendo Alexis DO  Last diagnosis associated with med order: 1. Cerebral artery occlusion with cerebral infarction (H)  - atorvastatin (LIPITOR) 10 MG tablet [Pharmacy Med Name: Atorvastatin Calcium Oral Tablet 10 MG]; Take 1 tablet by mouth daily.  Dispense: 90 tablet; Refill: 0    2. Gastroesophageal reflux disease without esophagitis  - omeprazole (PRILOSEC) 20 MG capsule [Pharmacy Med Name: Omeprazole Oral Capsule Delayed Release 20 MG]; Take 1 capsule by mouth daily.  Dispense: 90 capsule; Refill: 0    3. Vitamin D deficiency  - cholecalciferol, vitamin D3, 1,000 unit (25 mcg) tablet [Pharmacy Med Name: Vitamin D Oral Tablet 25 MCG (1000 UT)]; Take 1 tablet (1,000 Units total) by mouth daily.  Dispense: 90 tablet; Refill: 0    If protocol  passes may refill for 12 months if within 3 months of last provider visit (or a total of 15 months).                omeprazole (PRILOSEC) 20 MG capsule [Pharmacy Med Name: Omeprazole Oral Capsule Delayed Release 20 MG] 90 capsule 0     Sig: Take 1 capsule by mouth daily.       GI Medications Refill Protocol Passed - 12/24/2020 10:11 AM        Passed - PCP or prescribing provider visit in last 12 or next 3 months.     Last office visit with prescriber/PCP: 1/2/2019 Rosendo Alexis DO OR same dept: Visit date not found OR same specialty: 12/23/2020 Ange Saenz MD  Last physical: 11/5/2019 Last MTM visit: Visit date not found   Next visit within 3 mo: Visit date not found  Next physical within 3 mo: Visit date not found  Prescriber OR PCP: Rosendo Alexis DO  Last diagnosis associated with med order: 1. Cerebral artery occlusion with cerebral infarction (H)  - atorvastatin (LIPITOR) 10 MG tablet [Pharmacy Med Name: Atorvastatin Calcium Oral Tablet 10 MG]; Take 1 tablet by mouth daily.  Dispense: 90 tablet; Refill: 0    2. Gastroesophageal reflux disease without esophagitis  - omeprazole (PRILOSEC) 20 MG capsule [Pharmacy Med Name: Omeprazole Oral Capsule Delayed Release 20 MG]; Take 1 capsule by mouth daily.  Dispense: 90 capsule; Refill: 0    3. Vitamin D deficiency  - cholecalciferol, vitamin D3, 1,000 unit (25 mcg) tablet [Pharmacy Med Name: Vitamin D Oral Tablet 25 MCG (1000 UT)]; Take 1 tablet (1,000 Units total) by mouth daily.  Dispense: 90 tablet; Refill: 0    If protocol passes may refill for 12 months if within 3 months of last provider visit (or a total of 15 months).                cholecalciferol, vitamin D3, 1,000 unit (25 mcg) tablet [Pharmacy Med Name: Vitamin D Oral Tablet 25 MCG (1000 UT)] 90 tablet 0     Sig: Take 1 tablet (1,000 Units total) by mouth daily.       There is no refill protocol information for this order

## 2021-06-14 NOTE — TELEPHONE ENCOUNTER
RN cannot approve Refill Request    RN can NOT refill this medication med is not covered by policy/route to provider. Last office visit: Visit date not found Last Physical: Visit date not found Last MTM visit: Visit date not found Last visit same specialty: Visit date not found.  Next visit within 3 mo: Visit date not found  Next physical within 3 mo: Visit date not found      Rajat Blanton, Care Connection Triage/Med Refill 1/28/2021    Requested Prescriptions   Pending Prescriptions Disp Refills     acetaminophen (TYLENOL) 500 MG tablet 100 tablet 5     Sig: Take 1 tablet (500 mg total) by mouth every 4 (four) hours as needed for pain.       There is no refill protocol information for this order        aspirin 81 mg chewable tablet 90 tablet 3     Sig: Chew 1 tablet (81 mg total) daily.       There is no refill protocol information for this order        atorvastatin (LIPITOR) 40 MG tablet 90 tablet 3     Sig: Take 1 tablet (40 mg total) by mouth at bedtime.       There is no refill protocol information for this order        calcium-vitamin D (OYSCO 500/D) 500 mg(1,250mg) -200 unit per tablet 180 tablet 3     Sig: Take 1 tablet by mouth 2 (two) times a day with meals.       There is no refill protocol information for this order        zqzickiyjnojint-chiqoxp-[id number] (REFRESH OPTIVE ADVANCED) 0.5-1-0.5 % Drop 30 mL 3     Sig: Apply 1 drop to eye 2 (two) times a day.       There is no refill protocol information for this order        cholecalciferol, vitamin D3, 1,000 unit (25 mcg) tablet 90 tablet 3     Sig: Take 1 tablet (1,000 Units total) by mouth daily.       There is no refill protocol information for this order        fluticasone propion-salmeteroL (ADVAIR DISKUS) 250-50 mcg/dose DISKUS 180 each 3     Sig: Inhale 1 puff 2 (two) times a day.       There is no refill protocol information for this order        lisinopriL (PRINIVIL,ZESTRIL) 10 MG tablet 180 tablet 3     Sig: Take 1 tablet (10 mg total) by mouth 2  (two) times a day.       There is no refill protocol information for this order        neomycin-polymyxin-dexamethasone (MAXITROL) 3.5mg/mL-10,000 unit/mL-0.1 % ophthalmic suspension 5 mL 11     Sig: Apply a small amount       There is no refill protocol information for this order        ticagrelor (BRILINTA) 90 mg Tab 90 tablet 3     Sig: Take 1 tablet (90 mg total) by mouth daily.       There is no refill protocol information for this order        omeprazole (PRILOSEC) 20 MG capsule 90 capsule 3     Sig: Take 1 capsule by mouth daily.       There is no refill protocol information for this order

## 2021-06-14 NOTE — TELEPHONE ENCOUNTER
Order pended for once daily if appropriate.  Meryl Mota CMA ............... 12:54 PM, 01/12/21

## 2021-06-14 NOTE — PROGRESS NOTES
Subjective findings: Ms. Vela returned to the clinic today with continued complaints of painful    calluses on the bottom of the right foot.  .       OBJECTIVE FINDINGS: Nails bilateral feet are within normal limits this date.    Skin bilaterally warm and intact. There is thick round raised    hyperkeratotic nucleated lesion sub 1st metatarsal head of right foot.    There is no edema, erythema, cellulitis, drainage, or bleeding noted. DP and    PT pulses +1/4 bilaterally. Capillary refill less than 2 seconds    bilaterally. Negative clonus and negative Babinski bilaterally. Range of    motion within normal limits bilaterally. Muscle power +4/5 bilaterally    within all compartments.       ASSESSMENT: Intractable plantar keratoma.       PLAN: Debrided the  hyperkeratotic lesions right foot  today.She is to return to clinic every 2 months for continued    foot care.

## 2021-06-14 NOTE — PROGRESS NOTES
[facility] Care Coordination Contact    Faxed form to [employer] for temp 45 day pca increase.    Provider Signature - No POC Shared:  Member indicates that they do not want their POC shared with any EW providers. and Member Signature - POC Change:  Per CC, member has made a change to their POC.  Care Plan Change Letter mailed to member for signature with a self-addressed return envelope.     Christiana Maldonado  Care Management Specialist  [facility]  [phone]

## 2021-06-14 NOTE — PROGRESS NOTES
[facility] Care Coordination Contact    2nd Attempt: Signed Letter not received from member, resent per process.     Christiana Maldonado  Care Management Specialist  [facility]  [phone]

## 2021-06-14 NOTE — TELEPHONE ENCOUNTER
Reason for Call:  Home Health Care    Precious with [facility] called regarding (reason for call): Verbal order    Orders are needed for this patient. PT    PT:  1x week for 3week for date training and care giver education    OT: na    Skilled Nursing: NA    Pt Provider: Dr Saenz    Phone Number Homecare Nurse can be reached at: [phone]  Precious    Can we leave a detailed message on this number? Yes     Best Time: any    Call taken on 1/7/2021 at 8:51 AM by Laura L Goldberg

## 2021-06-14 NOTE — PROGRESS NOTES
Assessment/Plan:        Problem List Items Addressed This Visit        ENT/CARD/PULM/ENDO Problems    Ischemic Stroke, see summary below. MRI brain 12/17/2020:Focal, acute/subacute nonhemorrhagic infarct right parietal lobe near the vertex. There may be mild associated gyral swelling but without other mass effect. No other mass effect.  2.  Numerous supratentorial and infratentorial infarcts as detailed above.  3.  Atrophy and chronic small vessel vascular changes.  4.  Linear-type infarct also noted in the right paramedian midbrain.  - Primary    Relevant Orders    Bed, Hospital [facility]: Patient has had a number of strokes, latest one she was admitted to hospital 12/18-12/21/2020, and has weakness of the left side of her body after this last one, needs assistance with walking around in her apartment, two family members help her to walk to bathroom or to bed. Choking with food per her daughter, needing to sit upright to eat. Family feed her. Daughter describes her choking when eating, it is possible she has aspiration. According to her daughter she is also choking at night when lying flat in a regular bed. Patient does not currently have pressure wounds but needs to be repositioned to prevent this from happening, and she is not able to reposition herself in a regular bed.     Ambulatory referral to Home Health       Other    Dysphagia, see summary below    Relevant Orders    Bed, Hospital [facility]    Ambulatory referral to Home Health    Hemiparesis, left side secondary to her stroke.    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  Family said that she has 0% vision in her eye, 30% vision in her right eye.    Relevant Orders    Ambulatory referral to Home Health    Memory loss, keeps asking the same questions, since this last stroke.    Relevant Orders    Ambulatory referral to Home Health      Other Visit  Diagnoses     Tension headache        Vitamin D deficiency        Health care maintenance        Right sided cerebral hemisphere cerebrovascular accident (CVA), as above and below (H)        Relevant Orders    Bed, Hospital [facility]    Ambulatory referral to Home Health    Aspiration into airway, initial encounter. Family note that she chokes with eating.        Relevant Orders    Bed, Hospital [facility]    Ambulatory referral to Home Health    Gait disorder, unable to walk after the stroke.         Relevant Orders    Ambulatory referral to Home Health            Patient has had a number of strokes (initially in 1999), latest one she was admitted to hospital 12/18-12/21/2020, and has weakness of the left side of her body after this last one, needs assistance with walking around in her apartment, two family members help her to walk to bathroom or to bed. Choking with food per her daughter, needing to sit upright to eat. Family feed her. Daughter describes her choking when eating, it is possible she has aspiration. According to her daughter she is also choking at night when lying flat in a regular bed. Patient does not currently have pressure wounds but needs to be repositioned to prevent this from happening, and she is not able to reposition herself in a regular bed.     Patient was referred to home care by [facility] staff after admission 12/18-12/21/2020, but home care staff told her daughter that she needed my visit for the patient to be seen. Referral was placed to [facility]  [phone] / [phone]. She needs skilled nursing, PT, OT evaluation and treatment, home safety evaluation, and speech pathology assessment and treatment (daughter describes swallowing problems and choking with eating).     Daughter says the patient lives by herself but that she can't get around her apartment and 2 family members have to help her go to the bathroom, get out of bed, and get around.  "Family also help her get dressed, they prepare food for her and feed her. Daughter Cori says she needs someone with her 24 hours a day, and family members are rotating and also stay with her overnight.       The face-to-face encounter occurred on date: 12/23/2020   Face to Face encounter was with: Ange Saenz     Please provide brief clinical summary of reason for visit and need for home care. As above.     Please identify which of the following home health disciplines the patient will need AND describe the skilled services that you would like the home health agency to perform: SKILLED NURSING (RN): Other assessment and coordination, medication set up, PHYSICAL THERAPY: strength training, gait training, ROM and other: daughter wants a wheelchair, assessment for wheelchair vs walker needs, OCCUPATIONAL THERAPY: ADLs and home safety, SPEECH LANGUAGE PATHOLOGY:speech and other, MEDICAL SOCIAL WORK (family are wanting help, patient lives alone, has an elderly waiver per daughter and MA, daughter feels she needs 24 hour care) and HOME HEALTH AIDE     Homebound Status (describe the functional limitations that support this patient is confined to his/her home. Medicaid recipients are not required to be homebound.):unable to leave home without caregiver     Name of physician who will be responsible for the ongoing home health plan of care (CMS requires the referring physician to provide the specific name of the community physician instead of a title, such as \"PCP\"): Ange Saenz    Requested Start of Care Date: Within 24 hours     Other information to assist the home health agency in developing the initial Plan of Care:     I certify that services are/were furnished while this patient was under the care of a physician and that a physician or an allowed non-physician practitioner (NPP), had a face-to-face encounter that meets the physician face-to-face encounter requirements. The encounter was in whole, or " "in part, related to the primary reason for home health. The patient is confined to his/her home and needs intermittent skilled nursing, physical therapy, speech-language pathology, or the continued need for occupational therapy. A plan of care has been established by a physician and is periodically reviewed by a physician.    Neurology recommended 12/18/2020 using Brilinta 90 mg twice a day with aspirin 81 mg daily. \"acute R parietal stroke. Vessel imaging showed diffuse ICAD worse on L MCA. MRI brain also showed old bi hemispheric watershed distribution FLAIR changes and encephalomalacia + atrophy. Review of MRA from 2011 shows progressive ICAD. Patient has been taking aggrenox for ~20 years \"      Subjective:    Patient ID: Toshia Vela is a 75 y.o. female.    HPI   Patient is here to establish primary care.  Patient was admitted to [facility] stroke [place], December 18, 2020-December 21, 2020.  She has a history of CVA in the past with residual left hemiparesis.  Other medical problems include asthma, hypertension, hyperlipidemia, chronic headaches, gastroesophageal reflux disease.  Patient was admitted with nausea, vomiting, dizziness.  She was found to have evidence of an acute cerebrovascular accident.  Infarction right parietal lobe near the vertex.  EKG was done, no overt ischemia.  Potassium was low at 3.2.  Hemoglobin 13.2.  Covid test was done which was negative.  CT aortic survey, unremarkable thoracic and abdominal aorta.  No mass-effect on her CT scan.  In addition she has numerous supratentorial and infratentorial infarcts on the CT scan that was done.  MRA showed stenosis in the left MCA bifurcation, without evidence of large vessel occlusion.  Moderately severe focal stenosis of the P2 segment of the right PCA.  She was not a candidate for TPA.  She was given aspirin, intravenous fluids, Zofran and meclizine.  Neurology were consulted.  Aspirin 81 mg, and Brilinta 90 mg " "twice a day was started.  Lipitor was increased from 10 mg to 40 mg daily.  Transthoracic echocardiogram showed ejection fraction of 60-65%.  No thrombus seen in the left ventricle.  Bubble study was negative.    Patient also had chest pain, EKG was done, serial troponins were ordered [not elevated].  Cardiology was consulted, Lexiscan stress test was done, and this was negative for ischemia.  Lisinopril 10 mg daily was started.  Not previously on blood pressure medications.    Mild intermittent asthma.  Patient has been on Advair.    Gastroesophageal reflux disease on a PPI.    Home health care was ordered through [facility].  [phone] / [phone].    \"She lives with her family. Contacted her daughter Diamond and another daughter was on the call. Per her daughters, her PCP was at the [facility] in [place] but has left the clinic and she was going to be set up with a different provider. I let them know I would set up an appointment. I also reviewed the recommendation for skilled home care services. They said she has PCA. I stated the home care services ordered are skilled services that need to be provided by an agency that provides those services. They agreed to [facility]. Daughter was also asking for hospital bed, WC and BSC. I stated I would need to check with the hospitalist Dr. Lay about that\".    \"Referral has been sent to [facility] by SARAH. PCP follow up scheduled. Updated [facility] about this appointment as well. Contacted Dr. Lay regarding the family's request for equipment. Dr. Lay stated they would need to get orders from patient's PCP for this. Contacted the daughter about this and she was very upset and stated if they could not get the equipment their mother could not discharge. Passed this information onto bedside RN and charge RN.\"    BMP December 19, 2020: Sodium 144, potassium 3.7, chloride 112, CO2 25, BUN 8, creatinine 0.52, calcium 8.9.  LDL 37, triglyceride 51, HDL 62.  " "HbA1c 5.6%.  White cell count 3.5, hemoglobin 12.2, platelet count 296, MCV 92      10/6/2020  \"Care Plan & Recommendations: Due to COVID restrictions Toshia has not been able to attend adult day care and she is requesting an increase to homemaking to help with meal prep, laundry, homemaking and errands. Will increase to 5 hrs/wk. Toshia has requested a list of adult day care's that are open and hopes to switch.\"    History is obtained from the patient's daughter, patient is unable to answer questions due to her stroke.    Patient's daughter says she [place] lived [place].  She says she lives by herself, used to have a PCA.  Her family work.  She lives in senior housing.    1999 she was paralyzed, states she had trouble swallowing.  She had trouble with speech.  Her speech improved.  She came to the  in 2002.  Was seen at the [facility].  In 2011 came to live with Diamond.    Follows current stroke she has left-sided weakness in her arm and leg.  She cannot walk currently.  She was able to walk.  2 people are needing to help her transfer.  It sounds like family are having people come over and stay with her, to help her.  She has difficulty with swallowing, she has been choking when she eats.  Family understand her, where other people may not understand her.  She has urinary incontinence.  She needs help with putting clothes on, and feeding her.  She keeps asking the same questions.  She has a diaper for urinary incontinence.    She has decreased vision, 30% in her right eye, no vision on the left.    She has had asthma for 15 years, does not smoke never smoked.    Family were told that she had to be seen first before she could get home care.  That is why she is here today.  Needs a wheelchair.    The following portions of the patient's history were reviewed and updated as appropriate: allergies, current medications, past family history, past medical history, past social history, past surgical history and " problem list.    Review of Systems   As above, no other symptoms. Symptom review obtained from the patient's daughter, unable to obtain from the patient due to her stroke (?). Patient can speak, but daughter interprets for her as she doesn't speak english.        /73   Pulse 73   SpO2 99%     Objective:    Physical Exam   Patient is in a wheelchair. Legally blind. Able to answer questions with her daughter as an  for her language. She also understands what I say back to her daughter and her daughter interprets. Alert. Examined in wheelchair. Patient's daughter says two people are required to help her with walking. Patient is able to lift both arms up. Chest is clear, normal heart sounds, regular and I don't hear any murmurs. No peripheral edema.        Total Time: 40 minutes . I spent 40 minutes face to face with the patient with more than 50% spent on counseling regarding stroke, needs for home care, physical limitations from her stroke. Patient's family are feeding her, and clothing her and toileting her, and helping her with transfers.

## 2021-06-14 NOTE — TELEPHONE ENCOUNTER
Reason for Call:  Other prescription clarification    Detailed comments: Ticagrelor rx was sent in with directions to take one tablet by mouth for one dose with a qty of 90. Need to know if pt is only supposed to take one tab only or was the patient to take one a day?     Phone Number to  be reached at: [phone]    Best Time: anytime    Can we leave a detailed message on this number?: Yes    Call taken on 1/12/2021 at 9:25 AM by Arabella Wetzel

## 2021-06-14 NOTE — TELEPHONE ENCOUNTER
Home care nurse uRfina states she called earlier today and did not hear back from the on-call provider.  Rufina requesting home care orders, initial eval has been completed.      Skilled nursing  PT/OT  Social work  Speech therapy    02:31PM:  Smart Web used to page on-call MD You Ray to call this RN back at [phone].   02:34PM:  MD called, gave approval for home care orders.      Rufina is aware, no further questions.     Anupama Vu, RN, FNA    Additional Information    Negative: ED call to PCP    Negative: Physician call to PCP    Negative: Call about patient who is currently hospitalized    Negative: Lab or radiology calling with CRITICAL test results    Negative: [1] Prescription not at pharmacy AND [2] was prescribed today by PCP    Negative: [1] Follow-up call from patient regarding patient's clinical status AND [2] information urgent    Negative: [1] Caller requests to speak ONLY to PCP AND [2] URGENT question    Negative: [1] Caller requests to speak to PCP now AND [2] won't tell us reason for call  (Exception: if 10 pm to 6 am, caller must first discuss reason for the call)    Negative: Notification of hospital admission    Negative: Notification of death    Negative: Caller requesting lab results    Negative: Lab or radiology calling with test results    Negative: [1] Follow-up call from patient regarding patient's clinical status AND [2] information NON-URGENT    Negative: [1] Caller requests to speak ONLY to PCP AND [2] NON-URGENT question    Negative: Caller requesting an appointment, triage offered and declined    Negative: [1] Other NON-URGENT information for PCP AND [2] does not require PCP response    Protocols used: [facility]

## 2021-06-15 NOTE — TELEPHONE ENCOUNTER
aspirin-dipyridamole (AGGRENOX)  mg per 12 hr capsule [[id number]]    Electronically signed by: Moises Garcia CNP on 11/23/20 1958 Status: Discontinued   Ordering user: Moises Garcia CNP 11/23/20 1958 Authorized by: Moises Garcia CNP   Frequency: BID 11/23/20 - 12/23/20  Released by: Moises Garcia CNP 11/23/20 1958   Discontinued by: Ange Saenz MD 12/23/20 1456   Diagnoses   Cerebrovascular accident (CVA), unspecified mechanism (H) [I63.9]     Former patient of ??? & has not established care with another provider.  Please assign refill request to covering provider per Clinic standard process.  ??

## 2021-06-15 NOTE — PROGRESS NOTES
[facility] Care Coordination      [facility] Six-Month Telephone Assessment    6 month telephone assessment completed on 2/16/21.    ER visits: Yes  Hospitalizations: Yes -  [facility]  TCU stays: No  Significant health status changes: significant change due to stroke. Preeti moved into an Adult Foster Care setting on 2/5/21  Falls/Injuries: No  ADL/IADL changes: Yes: Now requires 24/7 supervision  Changes in services: Yes: Now requires 24/7 supervison    Caregiver Assessment follow up:  Writer will complete the transfer paperwork as member will be changing to [facility] as PCC. New address for member is: [address] [place] [id number]    Goals: See POC in chart for goal progress documentation.  Reviewed and updated.     Will see member in 6 months for an annual health risk assessment.   Encouraged member to call CC with any questions or concerns in the meantime.   Terri Francois RN  [facility]  [phone]

## 2021-06-15 NOTE — TELEPHONE ENCOUNTER
Reason for Call:  Form, our goal is to have forms completed with 72 hours, however, some forms may require a visit or additional information.    Type of letter, form or note:  Home Care Orders     Who is the form from?: Home care    Where did the form come from: [facility]     What clinic location was the form placed at?: placed at MA's desk    What number is listed as a contact on the form?: Priyanka with [facility] [phone]       Additional comments: outstanding certs and orders   Please sign and fax to [phone]    Call taken on 2/24/2021 at 12:36 PM by Romana Lemos

## 2021-06-16 NOTE — PROGRESS NOTES
Assessment and Plan:       1. Hearing loss  Likely due to impacted cerumen, this was irrigated bilaterally.  Suggested they could still do the hearing evaluation to see if there is any additional sensorineural hearing loss, but I left this up to them.  - Ambulatory referral to Audiology  - Ear cerumen removal; Future    2. Routine general medical examination at a health care facility  Check labs today.  Overall she is feeling well.  Updated pneumococcal vaccination today.  - Comprehensive Metabolic Panel  - HM2(CBC w/o Differential)    3. Encounter for screening for lipoid disorders  - Lipid Cascade, FASTING    4. Ischemic Stroke  - aspirin-dipyridamole (AGGRENOX)  mg per 12 hr capsule; Take 1 capsule by mouth 2 (two) times a day.  Dispense: 180 capsule; Refill: 3    5. 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 Asthma  ACT score 25 today    7. Cerebral artery occlusion with cerebral infarction  - aspirin-dipyridamole (AGGRENOX)  mg per 12 hr capsule; Take 1 capsule by mouth 2 (two) times a day.  Dispense: 180 capsule; Refill: 3    8. Bilateral impacted cerumen  Irrigated by CA today    9. Colon cancer screening  Counseled on colon cancer screening, daughter would like to sign her up for colonoscopy.  This will probably be her last one if normal.  I verified that her age is somewhere between 70-72.  She requests a female provider, this was put in the comments line of the order.  - Ambulatory referral for Colonoscopy     The patient's current medical problems were reviewed.    I have had an Advance Directives discussion with the patient.  The following health maintenance schedule was reviewed with the patient and provided in printed form in the after visit summary:   Health Maintenance   Topic Date Due     COLONOSCOPY  01/01/1996     PNEUMOCOCCAL CONJUGATE VACCINE FOR ADULTS (PCV13 OR PREVNAR)  01/01/2011     TD 18+ HE  01/12/2015     INFLUENZA VACCINE RULE BASED (1) 08/01/2017     ASTHMA  CONTROL TEST  05/25/2018     ASTHMA FOLLOW-UP  05/25/2018     FALL RISK ASSESSMENT  05/25/2018     MAMMOGRAM  10/31/2018     DXA SCAN  10/31/2018     ADVANCE DIRECTIVES DISCUSSED WITH PATIENT  12/02/2020     PNEUMOCOCCAL POLYSACCHARIDE VACCINE AGE 65 AND OVER  Completed     ZOSTER VACCINE  Completed        Subjective:   Chief Complaint: Toshia Vela is an 73 y.o. female here for an Annual Wellness visit.   HPI:  Toshia Vela is a 73-year-old woman who is here with her daughter Diamond and the [facility] .    Overall, her health has been well.  Her daughter and other family members have made note that sometimes she has difficulty hearing them.  She admits to this, but also states that she has a history of earwax.    She is not bothered by her breathing at all.  We talked about vaccinations including the pneumonia vaccination and colon cancer screening.  She is willing to do blood tests today.    She is still on the Aggrenox    Review of Systems:    Please see above.  The rest of the review of systems are negative for all systems.    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   Family History   Problem Relation Age of Onset     Family history unknown: Yes      Social History     Social History     Marital status: Single     Spouse name: N/A     Number of children: N/A     Years of education: N/A     Occupational History     Not on file.     Social History Main Topics     Smoking status: Never Smoker     Smokeless  tobacco: Never Used     Alcohol use Not on file     Drug use: Not on file     Sexual activity: Not on file     Other Topics Concern     Not on file     Social History Narrative    She lives with her daughter.      Current Outpatient Prescriptions   Medication Sig Dispense Refill     acetaminophen (TYLENOL EXTRA STRENGTH) 500 MG tablet Take 1 tablet (500 mg total) by mouth every 4 (four) hours as needed for pain. 100 tablet 2     atorvastatin (LIPITOR) 10 MG tablet TAKE 1 TABLET DAILY-Please Schedule Physical Exam & Fasting Labs 90 tablet 3     calcium-vitamin D (OYSCO 500/D) 500 mg(1,250mg) -200 unit per tablet TAKE 1 TABLET BY MOUTH TWICE DAILY WITH MEALS 180 tablet 3     [id number] (REFRESH OPTIVE ADVANCED) 0.5-1-0.5 % Drop Apply 1 drop to eye 2 (two) times a day.       CHOLECALCIFEROL 1,000 unit tablet TAKE 1 TABLET BY MOUTH EVERY DAY 90 tablet 0     cholecalciferol, vitamin D3, (CHOLECALCIFEROL) 1,000 unit tablet Take 1 tablet (1,000 Units total) by mouth daily. 90 tablet 3     cholecalciferol, vitamin D3, 1,000 unit tablet 1,000 Units daily.       fluticasone-salmeterol (ADVAIR DISKUS) 250-50 mcg/dose DISKUS Inhale 1 puff 2 (two) times a day. 3 each 11     MULTIVITAMIN (ONCE DAILY ORAL) 1 tablet daily. Alive Once Daily Womens 50+       neomycin-polymyxin-dexamethasone (MAXITROL) 3.5mg/mL-10,000 unit/mL-0.1 % ophthalmic suspension Administer to the right eye 3 (three) times a day as needed. Apply a small amount       PRED FORTE 1 % ophthalmic suspension        RESTASIS 0.05 % ophthalmic emulsion        [DISCONTINUED] omeprazole (PRILOSEC) 20 MG capsule TAKE 1 CAPSULE BY MOUTH DAILY 30 capsule 10     aspirin-dipyridamole (AGGRENOX)  mg per 12 hr capsule Take 1 capsule by mouth 2 (two) times a day. 180 capsule 3     No current facility-administered medications for this visit.       Objective:   Vital Signs:   Visit Vitals     /68 (Patient Site: Left Arm, Patient Position: Sitting,  "Cuff Size: Adult Regular)     Pulse 65     Ht 5' 3\" (1.6 m)     Wt 168 lb 3 oz (76.3 kg)     SpO2 98%     Breastfeeding No     BMI 29.79 kg/m2        VisionScreening:  Vision Screening Comments: Patient declines. See outside dr     PHYSICAL EXAM  General: alert, no distress  HEENT: sclerae anicteric, moist oral mucosa, impacted cerumen bilaterally  Heart: Regular rate and rhythm, no murmurs.  No pretibial edema.  Warm extremities  Lungs: Clear to auscultation bilat  Gastrointestinal: abdomen is soft, non-tender, non-distended.    Skin: warm/dry, no rashes  Neuro: no gross abnormalities      Assessment Results 3/19/2018   Activities of Daily Living 1 - Full function   Instrumental Activities of Daily Living 5-6 - Severe functional impairment   Mini Cog Total Score 3   Some recent data might be hidden     A Mini-Cog score of 0-2 suggests the possibility of dementia, score of 3-5 suggests no dementia    Identified Health Risks:     The patient reports that she has difficulty with activities of daily living. I have asked that the patient make a follow up appointment in 52 weeks where this issue will be further evaluated and addressed.  The patient reports that she has difficulty with instrumental activities of daily living.  She was provided with a list of local organizations that provide support services and advised to make a follow up appointment in several weeks to address this further.   The patient was provided with written information regarding signs of hearing loss.  Information regarding advance directives (living hernandez), including where she can download the appropriate form, was provided to the patient via the AVS.       "

## 2021-06-18 NOTE — LETTER
Letter by Rosendo Alexis DO at      Author: Rosendo Alexis DO Service: -- Author Type: --    Filed:  Encounter Date: 1/2/2019 Status: (Other)       January 2, 2019     Patient: Toshia Vela   YOB: 1945   Date of Visit: 1/2/2019       To Whom It May Concern:    It is my medical opinion that Toshia Vela needs a 24-hour caregiver. Toshia displays several signs and symptoms of Alzheimer dementia, likely there is a component of vascular dementia as well given her history of stroke.  She has memory loss, forgets to turn off the burner, it is not safe to cook.  She needs assistance with food preparation, getting out of bed, changing her undergarments (wears a diaper), needs help with transportation.  She also is blind in one eye and only has 25% vision in the other eye.  She previously wondered from the house and police needed to be called.  Her memory impairment is not expected to slowly progress rather than improve.    I think it would be hazardous to her health if she does not have someone staying with her for 24 hours a day.  I would support from a medical standpoint to the need for 24-hour live-in caregiver.    If you have any questions or concerns, please don't hesitate to call.      Sincerely,            Rosendo Alexis DO  Internal Medicine  [facility]

## 2021-06-19 NOTE — LETTER
Letter by Missy Wetzel SW at      Author: Missy Wetzel SW Service: -- Author Type: --    Filed:  Encounter Date: 10/18/2019 Status: Signed       October 18, 2019    Important [employer] Information    TOSHIA WEBB ISMAIL  [address]  [place] [address]  Your Care Plan  Dear Toshia,  When we spoke recently, I promised to send you a Care Plan. The plan enclosed is a summary of our discussion. It includes the steps we agreed would help you meet your health goals. In addition, I can help you with:  Cvyttkm-X-RumlRJ  This program is available to members who need a ride to medical and dental visits. To schedule a ride, call [phone] or [phone] (toll free). TTY/TTD: 711. You can call 8 a.m. to 8 p.m. Seven days a week. Access to a representative may be limited at times.      Genomed   The Genomed program empowers you to improve your health through education and exercise. To learn more, visit PerfectServe, or call [employer] at [phone] (toll free) (TTY:711) from 7 a.m. - 7 p.m. Central Time, Monday-Friday.  Health Care Directive   This form helps you outline your health care wishes. You can request a form from me and I will answer any questions you have before you discuss it with your doctor.   Annual Physical  Take a key step on your path to good health and set up an annual physical at your clinic.  Questions?  Call me at [phone] Monday-Friday between 8am and 5pm.  TTY/TTD: 711. As we discussed, I plan to be in touch with you again in 6 months to follow up via phone.  Sincerely,      RACHEL Amato    E-mail: [email or web address]  [phone]      [facility] [place]          cc: member records                Civil Rights Notice  Discrimination is against the law. [employer] does not discriminate on the basis of any of the following:    Race    Color    National Origin    Creed    Jainism    Age    Public Assistance Status    Receipt of Health  Care Services    Disability (including physical or mental impairment)    Sex (including sex stereotypes and gender identity)    Marital Status    Political Beliefs    Medical Condition    Genetic Information    Sexual Orientation    Claims Experience    Medical History    Health Status    Auxiliary Aids and Services:  [employer] provides auxiliary aids and services, like qualified interpreters or information in accessible formats, free of charge and in a timely manner, to ensure an equal opportunity to participate in our health care programs. Contact [employer] Customer Service at [employer]/contact medicaid or call [phone] (toll free); TTY:717 or at [employer]/contactSignalPoint Communicationscaid.    Language Assistance Services:  [employer] provides translated documents and spoken language interpreting, free of charge and in a timely manner, when language assistance services are necessary to ensure limited English speakers have meaningful access to our information and services. Contact [employer] at [phone] (toll free); TTY: 635 or [employer]/contactmedicaid.     Civil Rights Complaints  You have the right to file a discrimination complaint if you believe you were treated in a discriminatory way by [employer]. You may contact any of the following four agencies directly to file a discrimination complaint.    U.S. Department of Health and Human Services Office for Civil Rights (OCR)  You have the right to file a complaint with the OCR, a federal agency, if you believe you have been discriminated against because of any of the following:    Race    Disability    Color    Sex    National Origin    Age      Contact the OCR directly to file a complaint:         Director         U.[place] Department of Health and Human Services Office for Civil Rights         [address] [place] [address], [place] [address]         [phone] (Voice)         [phone] (TDD)         Complaint Portal -  https://ocrportal.hhs.gov/ocr/portal/lobby.jsf     [place] Department of Human Rights (MDHR)  In [place], you have the right to file a complaint with the MDHR if you believe you have been discriminated against because of any of the following:      Race    Color    National Origin    Evangelical    Creed    Sex    Sexual Orientation    Marital Status    Public Assistance Status    Disability    Contact the MDHR directly to file a complaint:         [place] Department of Human Rights         [facility], [address] [place] [address]         [phone] (voice)          [phone] (toll free)         710 or [phone] ([place] Relay)         [phone] (Fax)         [email or web address] (Email)     [place] Department of Human Services (DHS)  You have the right to file a complaint with [facility] if you believe you have been discriminated against in our health care programs because of any of the following:    Race    Color    National Origin    Creed    Evangelical    Age    Public Assistance Status    Receipt of Health Care Services    Disability (including physical or mental impairment)    Sex (including sex stereotypes and gender identity)    Marital Status    Political Beliefs    Medical Condition    Genetic Information    Sexual Orientation    Claims Experience    Medical History    Health Status    Complaints must be in writing and filed within 180 days of the date you discovered the alleged discrimination. The complaint must contain your name and address and describe the discrimination you are complaining about. After we get your complaint, we will review it and notify you in writing about whether we have authority to investigate. If we do, we will investigate the complaint.      [facility] will notify you in writing of the investigations outcome. You have a right to appeal the outcome if you disagree with the decision. To appeal, you must send a written request to have [facility] review the  investigation outcome period. Be brief and state why you disagree with the decision. Include additional information you think is important.      If you file a complaint in this way, the people who work for the agency named in the complaint cannot retaliate against you. This means they cannot punish you in any way for filing a complaint. Filing a complaint in this way does not stop you from seeking out other legal or administration actions.     Contact DHS directly to file a discrimination complaint:        Civil Rights Coordinator        [place] Department of Human Services        Equal Opportunity and Access Division        [address] [address]        [place] [address] [phone] (voice) or use your preferred relay service     [employer] Complaint Notice   You have the right to file a complaint with [employer] if you believe you have been discriminated against because of any of the following:       Medical condition    Health status    Receipt of health care services    Claims experience    Medical history    Genetic information    Disability (including mental or physical impairment)    Marital status    Age    Sex (including sex stereotypes and gender identity)    Sexual orientation    National origin    Race    Color    Christian    Creed    Public assistance status    Political beliefs    You can file a complaint and ask for help in filing a complaint in person or by mail, phone, fax, or email at:     [employer] Civil Rights Coordinator  [facility]  [address], [place] [address] [phone] (voice and fax) or TTB:292  Email: [email or web address]"    American Indians can continue to use [facility] and [facility] ([facility]) clinics. We will not require prior approval or impose any conditions for you to get services at these clinics. For elders age 65 years and older this includes Elderly Waiver (EW) services accessed through the [facility]. If a doctor or other  provider in a [facility] or [facility] clinic refers you to a provider in our network, we will not require you to see your primary care provider prior to the referral.    For accessible formats of this publication or assistance with equal or access to our services, visit [email or web address], or call [phone] (toll free) or use your preferred relay service.

## 2021-06-19 NOTE — LETTER
Letter by Terri Francois RN at      Author: Terri Francois RN Service: -- Author Type: --    Filed:  Encounter Date: 10/30/2019 Status: Signed           October 30, 2019    Important [employer] Information    TOSHIA WEBB ISMAIL  [address]  [place] [address]    Your New Care Coordinator  Dear Toshia,  My name is Terri Montes De Oca RN and I am your new Care Coordinator. You may reach me by calling [phone]. I will be in touch with you shortly to address any questions you may have.   I have also been in contact with RACHEL Amato, your previous care coordinator, to ensure a smooth transition.  Questions?  Call me at [phone] Monday-Friday between 8am and 5pm. TTY/TDD: 106. I look forward to working with you as a [employer]  member.  Sincerely,      Terri Francois RN    E-mail: [email or web address]  Phone: [phone]    Care Manager  [facility]        cc: member records                                    Civil Rights Notice  Discrimination is against the law. [employer] does not discriminate on the basis of any of the following:    Race    Color    National Origin    Creed    Episcopal    Age    Public Assistance Status    Receipt of Health Care Services    Disability (including physical or mental impairment)    Sex (including sex stereotypes and gender identity)    Marital Status    Political Beliefs    Medical Condition    Genetic Information    Sexual Orientation    Claims Experience    Medical History    Health Status    Auxiliary Aids and Services:  [employer] provides auxiliary aids and services, like qualified interpreters or information in accessible formats, free of charge and in a timely manner, to ensure an equal opportunity to participate in our health care programs. Contact [employer] Customer Service at VOZ/contact medicaid or call [phone] (toll free); TTY:711 or at VOZ/contactmedicaid.    Language Assistance Services:  [employer]  provides translated documents and spoken language interpreting, free of charge and in a timely manner, when language assistance services are necessary to ensure limited English speakers have meaningful access to our information and services. Contact [employer] at [phone] (toll free); TTY: 711 or [place] Thorne.[place].[email or web address]/contactmedicaid.     Civil Rights Complaints  You have the right to file a discrimination complaint if you believe you were treated in a discriminatory way by [employer]. You may contact any of the following four agencies directly to file a discrimination complaint.    U.S. Department of Health and Human Services Office for Civil Rights (OCR)  You have the right to file a complaint with the OCR, a federal agency, if you believe you have been discriminated against because of any of the following:    Race    Disability    Color    Sex    National Origin    Age      Contact the OCR directly to file a complaint:         Director         U.S. Department of Health and Human Services Office for Civil Rights         [address] [place] [address]         [phone] (Voice)         [phone] (TDD)         Complaint Portal - https://ocrportal.hhs.gov/ocr/portal/lobby.jsf     [place] Department of Human Rights (MDHR)  In [place], you have the right to file a complaint with the MD if you believe you have been discriminated against because of any of the following:      Race    Color    National Origin    Scientologist    Creed    Sex    Sexual Orientation    Marital Status    Public Assistance Status    Disability    Contact the MD directly to file a complaint:         [place] Department of Human Rights         [address] [address]         [phone] (voice)          [phone] (toll free)         711 or [phone] ([place] Relay)         [phone] (Fax)         [email or web address] (Email)     [place]  Department of Human Services (DHS)  You have the right to file a complaint with [facility] if you believe you have been discriminated against in our health care programs because of any of the following:    Race    Color    National Origin    Creed    Hindu    Age    Public Assistance Status    Receipt of Health Care Services    Disability (including physical or mental impairment)    Sex (including sex stereotypes and gender identity)    Marital Status    Political Beliefs    Medical Condition    Genetic Information    Sexual Orientation    Claims Experience    Medical History    Health Status    Complaints must be in writing and filed within 180 days of the date you discovered the alleged discrimination. The complaint must contain your name and address and describe the discrimination you are complaining about. After we get your complaint, we will review it and notify you in writing about whether we have authority to investigate. If we do, we will investigate the complaint.      [facility] will notify you in writing of the investigations outcome. You have a right to appeal the outcome if you disagree with the decision. To appeal, you must send a written request to have [facility] review the investigation outcome period. Be brief and state why you disagree with the decision. Include additional information you think is important.      If you file a complaint in this way, the people who work for the agency named in the complaint cannot retaliate against you. This means they cannot punish you in any way for filing a complaint. Filing a complaint in this way does not stop you from seeking out other legal or administration actions.     Contact [facility] directly to file a discrimination complaint:        Civil Rights Coordinator        [place] Department of Human Services        Equal Opportunity and Access Division        [address] [address]        [place] [address] [phone] (voice) or use your preferred relay service     [employer]  Complaint Notice   You have the right to file a complaint with [facility] if you believe you have been discriminated against because of any of the following:       Medical condition    Health status    Receipt of health care services    Claims experience    Medical history    Genetic information    Disability (including mental or physical impairment)    Marital status    Age    Sex (including sex stereotypes and gender identity)    Sexual orientation    National origin    Race    Color    Mosque    Creed    Public assistance status    Political beliefs    You can file a complaint and ask for help in filing a complaint in person or by mail, phone, fax, or email at:     [employer] Civil Rights Coordinator  [employer]  [address], [place] [address] [phone] (voice and fax) or TTY:851  Email: [email or web address]    American Indians can continue to use [facility] and [facility] ([facility]) clinics. We will not require prior approval or impose any conditions for you to get services at these clinics. For elders age 65 years and older this includes Elderly Waiver (EW) services accessed through the [place]. If a doctor or other provider in a [facility] or [facility] clinic refers you to a provider in our network, we will not require you to see your primary care provider prior to the referral.    For accessible formats of this publication or assistance with equal or access to our services, visit [employer]/contactmedicaid, or call [phone] (toll free) or use your preferred relay service.

## 2021-06-19 NOTE — LETTER
Letter by Rosendo Alexis DO at      Author: Rosendo Alexis DO Service: -- Author Type: --    Filed:  Encounter Date: 11/6/2019 Status: Signed         Toshia Vela  [address]  [address] [place] [address]             November 6, 2019         Dear Ms. Vela,    Below are the results from your recent visit:    Resulted Orders   Lipid Cascade, FASTING   Result Value Ref Range    Cholesterol 141 <=199 mg/dL    Triglycerides 46 <=149 mg/dL    HDL Cholesterol 55 >=50 mg/dL    LDL Calculated 77 <=129 mg/dL    Patient Fasting > 8hrs? Yes    HM2(CBC w/o Differential)   Result Value Ref Range    WBC 2.7 (L) 4.0 - 11.0 thou/uL    RBC 4.57 3.80 - 5.40 mill/uL    Hemoglobin 13.7 12.0 - 16.0 g/dL    Hematocrit 41.6 35.0 - 47.0 %    MCV 91 80 - 100 fL    MCH 30.0 27.0 - 34.0 pg    MCHC 32.9 32.0 - 36.0 g/dL    RDW 11.3 11.0 - 14.5 %    Platelets 188 140 - 440 thou/uL    MPV 8.7 7.0 - 10.0 fL   Basic Metabolic Panel   Result Value Ref Range    Sodium 140 136 - 145 mmol/L    Potassium 4.4 3.5 - 5.0 mmol/L    Chloride 105 98 - 107 mmol/L    CO2 26 22 - 31 mmol/L    Anion Gap, Calculation 9 5 - 18 mmol/L    Glucose 87 70 - 125 mg/dL    Calcium 9.2 8.5 - 10.5 mg/dL    BUN 8 8 - 28 mg/dL    Creatinine 0.60 0.60 - 1.10 mg/dL    GFR MDRD Af Amer >60 >60 mL/min/1.73m2    GFR MDRD Non Af Amer >60 >60 mL/min/1.73m2    Narrative    Fasting Glucose reference range is 70-99 mg/dL per  American Diabetes Association (ADA) guidelines.   Hepatic Profile   Result Value Ref Range    Bilirubin, Total 0.8 0.0 - 1.0 mg/dL    Bilirubin, Direct 0.3 <=0.5 mg/dL    Protein, Total 7.2 6.0 - 8.0 g/dL    Albumin 3.9 3.5 - 5.0 g/dL    Alkaline Phosphatase 90 45 - 120 U/L    AST 19 0 - 40 U/L    ALT 23 0 - 45 U/L   Thyroid Stimulating Hormone (TSH)   Result Value Ref Range    TSH 1.18 0.30 - 5.00 uIU/mL   Vitamin B12   Result Value Ref Range    Vitamin B-12 759 213 - 816 pg/mL       All the tests look good including thyroid and vitamin B12.       Please call with questions or contact us using [employer].    Sincerely,        Electronically signed by Rosendo Alexis, DO

## 2021-06-19 NOTE — LETTER
Letter by Missy Wetzel SW at      Author: Missy Wetzel SW Service: -- Author Type: --    Filed:  Encounter Date: 10/14/2019 Status: Signed       Loves Parkprashant Felipe  [address] [place] [address], [address]  [place] [address]  Phone:  [phone]  Fax:  [phone]        October 14, 2019    TOSHIA Joyce [address] [place] [address]    Dear Toshia,    Enclosed is a copy of your completed PCA Assessment and Service Plan.  This is for your records and no action is required by you.  If you have additional questions regarding your assessment please contact me at [phone]. If you feel that your needs are not being met, please contact the Clinical Supervisor at [phone].    Sincerely,      RACHEL Amato    E-mail: [email or web address]  [phone]      [facility]            Enclosure:  Completed PCA assessment

## 2021-06-19 NOTE — PROGRESS NOTES
[facility] Clinic Note    Toshia Vela   73 y.o. female    Date of Visit: 7/5/2018  Chief Complaint   Patient presents with     Headache     ~ 1 mo      Face to Face for DME     Needs bath chair - unable to live alone - needs letter for 2 br apt so family can assist     Routine Health Maintenance     Due for Colonoscopy, pt declined, CA placed order per clinic protocol       ASSESSMENT/PLAN  1. Memory loss  Ambulatory referral to Dementia/Memory Loss Clinic   2. Legally blind  Bath Seat   3. Hemiparesis (H)  Bath Seat   4. History of TIA (transient ischemic attack) and stroke     5. History of stroke  Bath Seat     ---------------------------------------------    1. Suspect onset of vascular/Alz dementia.  I think they'd benefit from the memory clinic as a comprehensive approach to the memory loss    2-5. Bath seat provided    Ok to continue Tylenol for HA    Return in about 6 months (around 1/5/2019) for Recheck.      SUBJECTIVE  Toshia Vela is here for follow-up with her niece Diamond and also Angela.      #1 Memory loss noted, slowly progressing, short term is impaired but not long term memory.      #2 needs shower chair and forms filled out according to her disabilities (only 30% vision left in the right eye and left eye is blind) has left hemiparesis from prior stroke- some residual weakness, memory loss as above, and they don't feel comfortable with her being alone.      #3 she still has the occasional tension type headache which is worse with stress and worrying and goes away with Tylenol.        Medications, allergies, and problem list were reviewed and updated    Patient Active Problem List   Diagnosis     Asthma     Osteoarthritis     Difficulty Swallowing (Dysphagia)     Leukopenia     Ischemic Stroke     Hemiparesis     Legally Blind ([place] Definition) In The Right Eye     Corns     Gastroesophageal reflux disease without esophagitis     Memory loss     Past Medical History:   Diagnosis Date      Acute ischemic stroke (H) 1999     Asthma      Left hemiparesis (H)      Legally blind      Leukopenia      Osteoarthritis      Current Outpatient Prescriptions   Medication Sig Dispense Refill     acetaminophen (TYLENOL) 500 MG tablet Take 1 tablet (500 mg total) by mouth every 4 (four) hours as needed for pain. 100 tablet 2     aspirin-dipyridamole (AGGRENOX)  mg per 12 hr capsule Take 1 capsule by mouth 2 (two) times a day. 180 capsule 3     atorvastatin (LIPITOR) 10 MG tablet TAKE 1 TABLET DAILY-Please Schedule Physical Exam & Fasting Labs 90 tablet 3     calcium-vitamin D (OYSCO 500/D) 500 mg(1,250mg) -200 unit per tablet TAKE 1 TABLET BY MOUTH TWICE DAILY WITH MEALS 180 tablet 3     hgmohvqzaxpgafs-ynptfsa-gwym44 (REFRESH OPTIVE ADVANCED) 0.5-1-0.5 % Drop Apply 1 drop to eye 2 (two) times a day.       CHOLECALCIFEROL 1,000 unit tablet TAKE 1 TABLET BY MOUTH EVERY DAY 90 tablet 0     cholecalciferol, vitamin D3, (CHOLECALCIFEROL) 1,000 unit tablet Take 1 tablet (1,000 Units total) by mouth daily. 90 tablet 3     cholecalciferol, vitamin D3, 1,000 unit tablet 1,000 Units daily.       fluticasone-salmeterol (ADVAIR DISKUS) 250-50 mcg/dose DISKUS Inhale 1 puff 2 (two) times a day. 3 each 11     MULTIVITAMIN (ONCE DAILY ORAL) 1 tablet daily. Alive Once Daily Womens 50+       neomycin-polymyxin-dexamethasone (MAXITROL) 3.5mg/mL-10,000 unit/mL-0.1 % ophthalmic suspension Administer to the right eye 3 (three) times a day as needed. Apply a small amount       PRED FORTE 1 % ophthalmic suspension        RESTASIS 0.05 % ophthalmic emulsion        [DISCONTINUED] omeprazole (PRILOSEC) 20 MG capsule TAKE 1 CAPSULE BY MOUTH DAILY 30 capsule 10     No current facility-administered medications for this visit.      No Known Allergies    EXAM  Vitals:    07/05/18 1641   BP: 116/78   Patient Site: Left Arm   Patient Position: Sitting   Cuff Size: Adult Regular   Pulse: 78   SpO2: 98%         GEN: alert, no  distress  Heart: RRR  Lungs: CTA  Neuro: 4+/5 left handgrip, left hip flexion, left knee flex/ext, otherwise full strength    This visit lasted a total of 25 minutes.  Over 50% of the time was spent counseling regarding he management of memory loss, headache.       Rosendo Alexis,   Internal Medicine  [facility]

## 2021-06-20 NOTE — LETTER
Letter by Terri Francois RN at      Author: Terri Francois RN Service: -- Author Type: --    Filed:  Encounter Date: 10/9/2020 Status: (Other)       October 9, 2020    Important [employer] Information    TOSHIA WEBB [address]  [facility] [address]  Your Care Plan  Dear Toshia,  When we spoke recently, I promised to send you a Care Plan. The plan enclosed is a summary of our discussion. It includes the steps we agreed would help you meet your health goals. In addition, I can help you with:  [employer]P-Laun[employer]  This program is available to members who need a ride to medical and dental visits. To schedule a ride, call [phone] or [phone] (toll free). TTY/TTD: 711. You can call 8 a.m. to 8 p.m. Seven days a week. Access to a representative may be limited at times.      MicroVision   The MicroVision program empowers you to improve your health through education and exercise. To learn more, visit Springpad, or call [employer] at [phone] (toll free) (TTY:711) from 7 a.m. - 7 p.m. Central Time, Monday-Friday.  Health Care Directive   This form helps you outline your health care wishes. You can request a form from me and I will answer any questions you have before you discuss it with your doctor.   Annual Physical  Take a key step on your path to good health and set up an annual physical at your clinic.  Questions?  Call me at [phone] Monday-Friday between 8am and 5pm.  TTY/TTD: 711. As we discussed, I plan to be in touch with you again in 6 months to follow up via phone.  Sincerely,      Terri Francois RN    E-mail: [email or web address]  Phone: [phone]    Care Manager  [facility]          cc: member records                Civil Rights Notice  Discrimination is against the law. [employer] does not discriminate on the basis of any of the following:    Race    Color    National Origin    Creed    Latter day    Age    Public  Assistance Status    Receipt of Health Care Services    Disability (including physical or mental impairment)    Sex (including sex stereotypes and gender identity)    Marital Status    Political Beliefs    Medical Condition    Genetic Information    Sexual Orientation    Claims Experience    Medical History    Health Status    Auxiliary Aids and Services:  [employer] provides auxiliary aids and services, like qualified interpreters or information in accessible formats, free of charge and in a timely manner, to ensure an equal opportunity to participate in our health care programs. Contact [employer] Customer Service at Cloudy.fr/contact medicaid or call [phone] (toll free); TTY:710 or at Cloudy.fr/contactHappy Bits Companycaid.    Language Assistance Services:  [employer] provides translated documents and spoken language interpreting, free of charge and in a timely manner, when language assistance services are necessary to ensure limited English speakers have meaningful access to our information and services. Contact [employer] at [phone] (toll free); TTY: 688 or Cloudy.[email or web address]/contactmedicaid.     Civil Rights Complaints  You have the right to file a discrimination complaint if you believe you were treated in a discriminatory way by [facility]. You may contact any of the following four agencies directly to file a discrimination complaint.    U.S. Department of Health and Human Services Office for Civil Rights (OCR)  You have the right to file a complaint with the OCR, a federal agency, if you believe you have been discriminated against because of any of the following:    Race    Disability    Color    Sex    National Origin    Age      Contact the OCR directly to file a complaint:         Director         U.S. Department of Health and Human Services Office for Civil Rights         [address] [place] [address], [place] [address]         [phone] (Voice)         [phone] (TDD)          Complaint Portal - [email or web address].gov/ocr/portal/lobby.jsf     [place] Department of Human Rights (MDHR)  In [place], you have the right to file a complaint with the MDHR if you believe you have been discriminated against because of any of the following:      Race    Color    National Origin    Evangelical    Creed    Sex    Sexual Orientation    Marital Status    Public Assistance Status    Disability    Contact the MDHR directly to file a complaint:         [place] Department of Human Rights         [facility], [address] [place] [address]         [phone] (voice)          [phone] (toll free)         711 or [phone] ([place] Relay)         [phone] (Fax)         [email or web address] (Email)     [place] Department of Human Services (DHS)  You have the right to file a complaint with [facility] if you believe you have been discriminated against in our health care programs because of any of the following:    Race    Color    National Origin    Creed    Evangelical    Age    Public Assistance Status    Receipt of Health Care Services    Disability (including physical or mental impairment)    Sex (including sex stereotypes and gender identity)    Marital Status    Political Beliefs    Medical Condition    Genetic Information    Sexual Orientation    Claims Experience    Medical History    Health Status    Complaints must be in writing and filed within 180 days of the date you discovered the alleged discrimination. The complaint must contain your name and address and describe the discrimination you are complaining about. After we get your complaint, we will review it and notify you in writing about whether we have authority to investigate. If we do, we will investigate the complaint.      [facility] will notify you in writing of the investigations outcome. You have a right to appeal the outcome if you disagree with the decision. To appeal, you must send a written request to have [facility]  review the investigation outcome period. Be brief and state why you disagree with the decision. Include additional information you think is important.      If you file a complaint in this way, the people who work for the agency named in the complaint cannot retaliate against you. This means they cannot punish you in any way for filing a complaint. Filing a complaint in this way does not stop you from seeking out other legal or administration actions.     Contact DHS directly to file a discrimination complaint:        Civil Rights Coordinator        [facility]        Equal Opportunity and Access Division        [address] [address]        [place] [address] [phone] (voice) or use your preferred relay service     [employer] Complaint Notice   You have the right to file a complaint with [employer] if you believe you have been discriminated against because of any of the following:       Medical condition    Health status    Receipt of health care services    Claims experience    Medical history    Genetic information    Disability (including mental or physical impairment)    Marital status    Age    Sex (including sex stereotypes and gender identity)    Sexual orientation    National origin    Race    Color    Congregational    Creed    Public assistance status    Political beliefs    You can file a complaint and ask for help in filing a complaint in person or by mail, phone, fax, or email at:     [facility] Civil Rights Coordinator  [facility]  [address], [place] [address] [phone] (voice and fax) or TTS:693  Email: [email or web address]    American Indians can continue to use [facility] and [facility]) clinics. We will not require prior approval or impose any conditions for you to get services at these clinics. For elders age 65 years and older this includes Elderly Waiver (EW) services accessed through the [place]. If a doctor or  other provider in a [facility] or [facility] clinic refers you to a provider in our network, we will not require you to see your primary care provider prior to the referral.    For accessible formats of this publication or assistance with equal or access to our services, visit "[employer]/extraTKTmedicaid, or call [phone] (toll free) or use your preferred relay service.

## 2021-06-22 NOTE — PATIENT INSTRUCTIONS - HE
Stop Aggrenox and start clopidogrel (which will be once a day).      Hopefully this decreases the headaches    I will write a letter

## 2021-06-22 NOTE — PROGRESS NOTES
[facility] Note    Toshia Vela   73 y.o. female    Date of Visit: 12/12/2018  Chief Complaint   Patient presents with     Form     lift mechanism, live in aid     Extremity Weakness     bilateral arm and leg       ASSESSMENT/PLAN  1. Visit for screening mammogram  Mammo Screening Bilateral   2. Memory loss  Ambulatory referral to Dementia/Memory Loss Clinic   3. 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 Ischemic Stroke       ---------------------------------------------    1.  She should update mammogram, consider canceling screening given memory issues    2.  Referral to memory loss clinic    3-4.  History of stroke and visual impairment in the right eye    Return in about 3 months (around 3/12/2019).      SUBJECTIVE    Toshia Vela is here with son in law Abdisalam (Rubina is not present today).  Primary reason is face-to-face for medical equipment.  They want to see left.  He was not able to explain exactly what he wanted or why, I think it was initiated by Rubina.  It was a little puzzling because Toshia has no trouble getting up from a seat.  I filled out the forms honestly, and the fact that she can get up by herself.    They did not follow-up with memory loss clinic, though she often repeats questions.  I think that getting there would be helpful in her overall management.      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 100 tablet 2     atorvastatin (LIPITOR) 10 MG tablet Take 1 tablet (10 mg total) by mouth daily. 90 tablet 2     calcium-vitamin D (OYSCO 500/D) 500 mg(1,250mg) -200 unit per tablet Take 1 tablet by mouth 2 (two) times a day with meals. 180 tablet 2     [id number] (REFRESH OPTIVE ADVANCED) 0.5-1-0.5 % Drop Apply 1 drop to eye 2 (two) times a day.       CHOLECALCIFEROL 1,000 unit tablet TAKE 1 TABLET BY MOUTH EVERY DAY 90 tablet 0     cholecalciferol, vitamin D3, (CHOLECALCIFEROL) 1,000 unit tablet Take 1 tablet (1,000 Units total) by mouth daily. 90 tablet 2     cholecalciferol, vitamin D3, 1,000 unit tablet 1,000 Units daily.       fluticasone-salmeterol (ADVAIR DISKUS) 250-50 mcg/dose DISKUS Inhale 1 puff 2 (two) times a day. 3 each 11     MULTIVITAMIN (ONCE DAILY ORAL) 1 tablet daily. Alive Once Daily Womens 50+       neomycin-polymyxin-dexamethasone (MAXITROL) 3.5mg/mL-10,000 unit/mL-0.1 % ophthalmic suspension Administer to the right eye 3 (three) times a day as needed. Apply a small amount       omeprazole (PRILOSEC) 20 MG capsule TAKE 1 CAPSULE(20 MG) BY MOUTH DAILY 90 capsule 0     PRED FORTE 1 % ophthalmic suspension        RESTASIS 0.05 % ophthalmic emulsion        aspirin-dipyridamole (AGGRENOX)  mg per 12 hr capsule Take 1 capsule by mouth 2 (two) times a day. 180 capsule 3     No current facility-administered medications for this visit.      No Known Allergies    EXAM  Vitals:    12/12/18 1538   BP: 128/84   Patient Site: Left Arm   Patient Position: Sitting   Cuff Size: Adult Regular   Pulse: 70   SpO2: 98%   Weight: 179 lb 11.2 oz (81.5 kg)   Height: 5' 3\" (1.6 m)         General: Alert, no distress  Neurologic: Full strength in upper and lower extremities, albeit slightly weak with the left hand with finger " opposition      Rosendo Alexis,   Internal Medicine  [facility]

## 2021-06-22 NOTE — PROGRESS NOTES
formerly [facility] Note    Toshia Vela   74 y.o. female    Date of Visit: 1/2/2019  Chief Complaint   Patient presents with     Headache     frequent, every other day     Form     CDA       ASSESSMENT/PLAN  1. Ischemic Stroke  clopidogrel (PLAVIX) 75 mg tablet   2. 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 Dysphagia, unspecified type     4. Memory loss       ---------------------------------------------    Toshia has been having frequent headaches, which may be related to Aggrenox.  She was started on this back around 1999 when she had her last stroke.  I will change Aggrenox to Plavix which should have equal or superior stroke prevention qualities and has less potential to cause headache.    I will also write a letter reporting the need for 24-hour caregiver.    Return in about 3 months (around 4/2/2019) for Recheck, Annual physical.      SUBJECTIVE    Toshia Vela is a 74-year-old woman with history of stroke who presents for completion of a form as well as frequent headaches, happening every other day.  She is on Aggrenox for stroke prevention.     I last met with her in early December with her son-in-law Sejal.  Rubina (daughter) was not present, but is here today.  She is also here with a neighbor who helps take care of Edd.  Edd was not able to provide much meaningful information on the background of why she needed a lift seat.  She can stand on her own.  Apparently there were no problems with this as this was not the topic of discussion.    The mean concern other than the frequent headaches is that she needs 24-hour care.  She needs help with food preparation, waking up, changing diapers, transportation, has a tendency to wander away at one time the police were called.  She is blind in one eye and otherwise has 25% of her vision and the remaining eye.  She is not safe to to cook because she forgets to turn off the stove, has memory loss and probable dementia.  She has dysphasia and  cost with eating, so she needs to be supervised when she eats.      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 100 tablet 2     atorvastatin (LIPITOR) 10 MG tablet Take 1 tablet (10 mg total) by mouth daily. 90 tablet 2     calcium-vitamin D (OYSCO 500/D) 500 mg(1,250mg) -200 unit per tablet Take 1 tablet by mouth 2 (two) times a day with meals. 180 tablet 2     maoqiswumtvjuyt-[id number] (REFRESH OPTIVE ADVANCED) 0.5-1-0.5 % Drop Apply 1 drop to eye 2 (two) times a day.       CHOLECALCIFEROL 1,000 unit tablet TAKE 1 TABLET BY MOUTH EVERY DAY 90 tablet 0     cholecalciferol, vitamin D3, (CHOLECALCIFEROL) 1,000 unit tablet Take 1 tablet (1,000 Units total) by mouth daily. 90 tablet 2     cholecalciferol, vitamin D3, 1,000 unit tablet 1,000 Units daily.       fluticasone-salmeterol (ADVAIR DISKUS) 250-50 mcg/dose DISKUS Inhale 1 puff 2 (two) times a day. 3 each 11     MULTIVITAMIN (ONCE DAILY ORAL) 1 tablet daily. Alive Once Daily Womens 50+       neomycin-polymyxin-dexamethasone (MAXITROL) 3.5mg/mL-10,000 unit/mL-0.1 % ophthalmic suspension Administer to the right eye 3 (three) times a day as needed. Apply a small amount       omeprazole (PRILOSEC) 20 MG capsule TAKE 1 CAPSULE(20 MG) BY MOUTH DAILY 90 capsule 3     PRED  "FORTE 1 % ophthalmic suspension        RESTASIS 0.05 % ophthalmic emulsion        clopidogrel (PLAVIX) 75 mg tablet Take 1 tablet (75 mg total) by mouth daily. 90 tablet 3     No current facility-administered medications for this visit.      No Known Allergies    EXAM  Vitals:    01/02/19 1136   BP: 118/74   Patient Site: Left Arm   Patient Position: Sitting   Cuff Size: Adult Regular   Pulse: 74   SpO2: 98%   Weight: 174 lb 9.6 oz (79.2 kg)   Height: 5' 3\" (1.6 m)         General: Alert, no distress  Neurologic: No gross abnormalities    This visit lasted a total of 26 minutes.  Over 50% of the time was spent counseling regarding he management of stroke prevention, home care.       Rosendo Alexis,   Internal Medicine  [facility]    "

## 2021-06-23 NOTE — TELEPHONE ENCOUNTER
Letter faxed to Matthew as requested below and mailed to Diamond's address that was given below also.

## 2021-06-23 NOTE — TELEPHONE ENCOUNTER
Who is calling:  Daughter Tramaine Fields  Reason for Call:  Needs the letter completed at 1/2/2019 visit mailed to her at the address on file - [address] [place] [address].  The agency also has not received the letter please fax to them at [phone] - ATTN: Davi Dorado  Date of last appointment with primary care: 1/2/2019  Has the patient been recently seen:  Yes  Okay to leave a detailed message: No return call needed just please mail letter and fax to their worker.

## 2021-08-06 NOTE — PATIENT INSTRUCTIONS - HE
Patient Instructions by Rosendo Alexis DO at 11/5/2019 10:40 AM     Author: Rosendo Alexis DO Service: -- Author Type: Physician    Filed: 11/5/2019 11:43 AM Encounter Date: 11/5/2019 Status: Addendum    : Rosendo Alexis DO (Physician)    Related Notes: Original Note by Rosendo Alexis DO (Physician) filed at 11/5/2019 11:30 AM         Patient Education     Your Health Risk Assessment indicates you feel you are not in good physical health.    A healthy lifestyle helps keep the body fit and the mind alert. It helps protect you from disease, helps you fight disease, and helps prevent chronic disease (disease that doesn't go away) from getting worse. This is important as you get older and begin to notice twinges in muscles and joints and a decline in the strength and stamina you once took for granted. A healthy lifestyle includes good healthcare, good nutrition, weight control, recreation, and regular exercise. Avoid harmful substances and do what you can to keep safe. Another part of a healthy lifestyle is stay mentally active and socially involved.    Good healthcare     Have a wellness visit every year.     If you have new symptoms, let us know right away. Don't wait until the next checkup.     Take medicines exactly as prescribed and keep your medicines in a safe place. Tell us if your medicine causes problems.   Healthy diet and weight control     Eat 3 or 4 small, nutritious, low-fat, high-fiber meals a day. Include a variety of fruits, vegetables, and whole-grain foods.     Make sure you get enough calcium in your diet. Calcium, vitamin D, and exercise help prevent osteoporosis (bone thinning).     If you live alone, try eating with others when you can. That way you get a good meal and have company while you eat it.     Try to keep a healthy weight. If you eat more calories than your body uses for energy, it will be stored as fat and you will gain weight.     Recreation    Recreation is not limited to sports and team events. It includes any activity that provides relaxation, interest, enjoyment, and exercise. Recreation provides an outlet for physical, mental, and social energy. It can give a sense of worth and achievement. It can help you stay healthy.       Patient Education    Home Fire Safety  Each year, thousands of people, including children, are injured and killed in home fires. Children are often curious about fire, and may not understand the dangers. This makes home fire safety practices especially important. Three important things you can do to keep your home safe from fire are:    Install smoke alarms in your home and make sure they work properly.    Teach children not to play with matches, lighters, and other materials that can be used to start fires. And keep these materials out of childrens reach.    Teach children what to do in case of fire. Create a fire safety action plan and practice it.  Read on for more details about keeping your family and home safe from fire.        Being Prepared for a Fire  A home fire can happen at any time. The following can help you be prepared:    Install smoke alarms on every level of your home, including the basement and outside all sleeping areas. This simple step cuts your familys risk of dying in a fire nearly in half.    Test smoke alarms monthly, and change the batteries once a year or when the alarm chirps.    Dont disable smoke alarms, even for a short time.    Ask your local fire department for tips on where to place smoke alarms in your home.    Replace all smoke alarms every 10 years.    Consider using voice smoke alarms. These alarms allow you to substitute your own voice for the alarm sound. They are helpful because many children dont wake up to the sound of a regular smoke alarm.    Install carbon monoxide detectors near sleeping areas.    Be aware that carbon monoxide is a byproduct of smoke that can be deadly. Its a gas  that you cant see, smell, or taste.    Consider buying a combination smoke alarm / carbon monoxide detector.    Keep fire extinguishers in the home.    Keep them in accessible locations, especially in the kitchen.    Check usage dates to make sure they are not .    Use fire extinguishers only when the fire is in a contained area and is not spreading. (Otherwise, you should focus on getting out of the home.)    Train adults to use fire extinguishers. (Children should focus on getting out of the home during a fire.)    If you live in an apartment, talk to your landlord about where smoke alarms are and how often they are tested. Also ask about fire extinguisher locations and emergency exit routes.  Indoor Fire Safety  Many things in your home are potential fire hazards. Follow these steps to help keep your home safe.    Be careful in the kitchen.    Never leave food thats cooking unattended.    If a fire breaks out in a cooking pan, put a lid on it to smother it. And never throw water on a grease fire. It will make the fire worse.    Conduct a home safety inspection. Look for anything, such as frayed wires and cords, that can cause a fire. Fix or remove any fire safety hazards you find.    Keep all matches and lighters in a secured drawer or cabinet out of the reach of children. Use childproof lighters.    Check to make sure all appliances, including the stove, are turned off before leaving the home.    Know where the gas main shut-off is located.    Make sure space heaters are stable and have protective covers. Keep them at least 3 feet from anything that can burn, such as curtains. Dont use space heaters in areas where young kids spend time alone.    Keep flammable liquids such as kerosene and gasoline locked up and safely stored away from kids and heat.    Keep all smoking materials out of reach of children. And never smoke in bed. If possible, smoke outdoors only.  Outdoor Fire Safety  Fire can be a hazard  outdoors as well as indoors. When outdoors, be sure to do the following:    Always supervise kids near a barbecue grill, campfire, or portable stove.    Dont use fire pits around children. Kids can fall into them, and pits can be hot even after the fire goes out.    Keep a garden hose or fire extinguisher handy when cooking outdoors in case of fire.  Teaching Your Child About Fire Safety  One of the best ways to keep your home safe from fire is education. Make sure everyone in your family knows fire safety rules, including children.    Teach your children the dangers of matches, lighters, and other dangerous items.    Teach them to never touch these and other objects that are hot, such as candles.    Have them tell you right away whenever they find matches or lighters. Explain that these items are tools for grown-ups, not toys. And never amuse children with matches or lighters.    Round up all matches and lighters and store them safely. In case you missed some, ask your children to tell you where any are located throughout your home.    Never leave a child alone in a room with a lit candle. Dont allow teens to have candles in their rooms.    Show children what to do in case of fire.    Be sure your kids know what the fire alarm sounds like and what to do if it goes off.    Teach kids what to do if their clothes catch fire: Stop, Drop to the ground, and Roll until the fire is put out. They should also cover their face with their hands. Practice these steps with your children. Make sure they understand that running will make the fire burn faster.    Show children how to crawl below smoke during a fire.    Make sure kids know at least two escape routes from each room in the home. These escape routes can be windows.    Teach kids to test doors for nearby fire by feeling for heat with the back of their hand. If the door is warm or hot, they should try their second exit.    Explain to children that they cant hide from a  fire. Hiding in a closet or under a bed wont make them safe. Instead, they should try to escape the home. And if they cant escape, they should let others know they are trapped. They can do this by shutting the door to the room, opening a window, and turning on the lights.    Talk to your local fire department.    Introduce your children to a . Let them know that firefighters will look different when in full protective gear. Tell them to never hide from firefighters, and to follow all directions from firefighters during a fire.    Find out if the fire department has a fire safety program for kids.      Create a Fire Safety Plan  Create a plan for your family to follow in case of a fire. Try making it a family project. Important steps for the plan include leaving the home right away and having a designated meeting place.    Make sure your child understands to get out and stay out. He or she should get out of the home immediately and not go back in, even if family members or pets are still inside.    Decide on a safe meeting place away from the home for everyone to gather.    Teach children to call 911 or emergency services from a cell phone or neighbors phone. Make sure they know to do this only after they are safely out of the home.    Teach your children the fire safety plan. Practice it and make sure they understand it.    Have fire drills twice a year to keep your children prepared in case of fire.    Visit the National Fire Protection Association web site at www.nfpa.org for more information.      4825-7018 [employer]. [address] [place] [address], [place] [address]. All rights reserved. This information is not intended as a substitute for professional medical care. Always follow your healthcare professional's instructions.         Patient Education   Activities of Daily Living  Your Health Risk Assessment indicates you have difficulties with activities of daily living such as eating, getting  dressed, grooming, bathing, walking, or using the toilet. Please make a follow up appointment for us to address this issue in more detail.     Patient Education   Instrumental Activities of Daily Living  Your Health Risk Assessment indicates you have difficulties with instrumental activities of daily living which include laundry, housekeeping, banking, shopping, using the telephone, food preparation, transportation, or taking your own medications. Please make a follow up appointment for us to address this issue in more detail.    [facility] has resources available on the following website: [email or web address]     Also, here is a local agency that provides help with meals and other assistance:   [facility] Line: [phone]     Patient Education   Signs of Hearing Loss  Hearing loss is a problem shared by many people. In fact, it is one of the most common health conditions, particularly as people age. Most people over age 65 have some hearing loss, and by age 80, almost everyone does. Because hearing loss usually occurs slowly over the years, you may not realize your hearing ability has gotten worse.       Have your hearing checked  Contact your [facility] care provider if you:    Have to strain to hear normal conversation.    Have to watch other peoples faces very carefully to follow what theyre saying.    Need to ask people to repeat what theyve said.    Often misunderstand what people are saying.    Turn the volume of the television or radio up so high that others complain.    Feel that people are mumbling when theyre talking to you.    Find that the effort to hear leaves you feeling tired and irritated.    Notice, when using the phone, that you hear better with 1 ear than the other.    2696-8258 [employer]. [address] [place] [address], [place] [address]. All rights reserved. This information is not intended as a substitute for professional medical care. Always follow your  healthcare professional's instructions.         Patient Education   Urinary Incontinence, Female (Adult)  Urinary incontinence means loss of control of the bladder. This problem affects many women, especially as they get older. If you have incontinence, you may be embarrassed to ask for help. But know that this problem can be treated.  Types of Incontinence  There are different types of incontinence. Two of the main types are described here. You can have more than one type.    Stress incontinence. With this type, urine leaks when pressure (stress) is put on the bladder. This may happen when you cough, sneeze, or laugh. Stress incontinence most often occurs because the pelvic floor muscles that support the bladder and urethra are weak. This can happen after pregnancy and vaginal childbirth or a hysterectomy. It can also be due to excess body weight or hormone changes.    Urge incontinence (also called overactive bladder). With this type, a sudden urge to urinate is felt often. This may happen even though there may not be much urine in the bladder. The need to urinate often during the night is common. Urge incontinence most often occurs because of bladder spasms. This may be due to bladder irritation or infection. Damage to bladder nerves or pelvic muscles, constipation, and certain medicines can also lead to urge incontinence.  Treatment of urinary incontinence depends on the cause. Further evaluation is needed to find the type you have. This will likely include an exam and certain tests. Based on the results, you and your healthcare provider can then plan treatment. Until a diagnosis is made, the home care tips below can help relieve symptoms.  Home care    Do pelvic floor muscle exercises, if they are prescribed. The pelvic floor muscles help support the bladder and urethra. Many women find that their symptoms improve when doing special exercises that strengthen these muscles. To do the exercises contract the  muscles you would use to stop your stream of urine, but do this when youre not urinating. Hold for 10 seconds, then relax. Repeat 10 to 20 times in a row, at least 3 times a day. Your provider may give you other instructions for how to do the exercises and how often.    Keep a bladder diary. This helps track how often and how much you urinate over a set period of time. Bring this diary with you to your next visit with the provider. The information can help your provider learn more about your bladder problem.    Lose weight, if advised to by your provider. Excess weight puts pressure on the bladder. Your provider can help you create a weight-loss plan thats right for you. This may include exercising more and making certain diet changes.    Don't consume foods and drinks that may irritate the bladder. These can include alcohol and caffeinated drinks.    Quit smoking. Smoking and other tobacco use can lead to chronic cough that strains the pelvic floor muscles. Smoking may also damage the bladder and urethra. Talk with your provider about treatments or methods you can use to quit smoking.    If drinking large amounts of fluid causes you to have symptoms, you may be advised to limit your fluid intake. You may also be advised to drink most of your fluids during the day and to limit fluids at night.    If youre worried about urine leakage or accidents, you may wear absorbent pads to catch urine. Change the pads often. This helps reduce discomfort. It may also reduce the risk of skin or bladder infections.  Follow-up care  Follow up with your healthcare provider, or as directed. It may take some to find the right treatment for your problem. Your treatment plan may include special therapies or medicines. Certain procedures or surgery may also be options. Be sure to discuss any questions you have with your provider.  When to seek medical advice  Call the healthcare provider right away if any of these occur:    Fever of  100.4 F (38 C) or higher, or as directed by your provider    Bladder pain or fullness    Abdominal swelling    Nausea or vomiting    Back pain    Weakness, dizziness or fainting  Date Last Reviewed: 10/1/2017    3109-1981 [employer]. [address] [place] [address], [place] [address]. All rights reserved. This information is not intended as a substitute for professional medical care. Always follow your healthcare professional's instructions.     Patient Education   Your Health Risk Assessment indicates you feel you are not in good emotional health.    Recreation   Recreation is not limited to sports and team events. It includes any activity that provides relaxation, interest, enjoyment, and exercise. Recreation provides an outlet for physical, mental, and social energy. It can give a sense of worth and achievement. It can help you stay healthy.    Mental Exercise and Social Involvement  Mental and emotional health is as important as physical health. Keep in touch with friends and family. Stay as active as possible. Continue to learn and challenge yourself.   Things you can do to stay mentally active are:    Learn something new, like a foreign language or musical instrument.     Play SCRABBLE or do crossword puzzles. If you cannot find people to play these games with you at home, you can play them with others on your computer through the Internet.     Join a games club--anything from card games to chess or checkers or lawn bowling.     Start a new hobby.     Go back to school.     Volunteer.     Read.     Keep up with world events.       Patient Education   Preventing Falls in the Home  As you get older, falls are more likely. Thats because your reaction time slows. Your muscles and joints may also get stiffer, making them less flexible. Illness, medications, and vision changes can also affect your balance. A fall could leave you unable to live on your own. To make your home safer, follow these  tips:    Floors    Put nonskid pads under area rugs.    Remove throw rugs.    Replace worn floor coverings.    Tack carpets firmly to each step on carpeted stairs. Put nonskid strips on the edges of uncarpeted stairs.    Keep floors and stairs free of clutter and cords.    Arrange furniture so there are clear pathways.    Clean up any spills right away.    Bathrooms    Install grab bars in the tub or shower.    Apply nonskid strips or put a nonskid rubber mat in the tub or shower.    Sit on a bath chair to bathe.    Use bathmats with nonskid backing.    Lighting    Keep a flashlight in each room.    Put a nightlight along the pathway between the bedroom and the bathroom.    1573-4804 [employer]. [address] [place] [address]. All rights reserved. This information is not intended as a substitute for professional medical care. Always follow your healthcare professional's instructions.           Advance Directive  Patients advance directive was discussed and I am comfortable with the patients wishes.  Patient Education   Personalized Prevention Plan  You are due for the preventive services outlined below.  Your care team is available to assist you in scheduling these services.  If you have already completed any of these items, please share that information with your care team to update in your medical record.  Health Maintenance   Topic Date Due   ? HEPATITIS C SCREENING  1945   ? COLONOSCOPY  01/01/1996   ? ZOSTER VACCINES (2 of 3) 03/28/2008   ? TD 18+ HE  01/12/2015   ? MAMMOGRAM  10/31/2018   ? DXA SCAN  10/31/2018   ? MEDICARE ANNUAL WELLNESS VISIT  03/19/2019   ? ASTHMA CONTROL TEST  07/05/2019   ? FALL RISK ASSESSMENT  11/05/2020   ? ADVANCE CARE PLANNING  03/19/2023   ? PNEUMOCOCCAL IMMUNIZATION 65+ LOW/MEDIUM RISK  Completed   ? INFLUENZA VACCINE RULE BASED  Completed

## 2021-10-30 DIAGNOSIS — I63.513: ICD-10-CM

## 2021-11-01 NOTE — TELEPHONE ENCOUNTER
"Former patient of ??? & has not established care with another provider.  Please assign refill request to covering provider per clinic standard process.    Routing refill request to provider for review/approval because:  Labs not current:  Multiple  No pcp    Last Written Prescription Date:  7/23/21  Last Fill Quantity: 90,  # refills: 0   Last office visit provider:  12/23/20     Requested Prescriptions   Pending Prescriptions Disp Refills     BRILINTA 90 MG tablet [Pharmacy Med Name: BRILINTA (TICAGRELOR) 90MG TABS] 90 tablet 0     Sig: TAKE 1 TABLET BY MOUTH DAILY       Platelet Inhibitors Failed - 10/30/2021 12:23 PM        Failed - Normal ALT on file in past 12 months     Recent Labs   Lab Test 11/05/19  1159   ALT 23             Failed - Normal AST on file in past 12 months     Recent Labs   Lab Test 11/05/19  1159   AST 19             Passed - Normal HGB on file in past 12 months     Recent Labs   Lab Test 12/19/20  1458   HGB 12.2               Passed - Normal Platelets on file in past 12 months     Recent Labs   Lab Test 12/19/20  1458                  Passed - Recent (12 mo) or future (30 days) visit within the authorizing provider's specialty     Patient has had an office visit with the authorizing provider or a provider within the authorizing providers department within the previous 12 mos or has a future within next 30 days. See \"Patient Info\" tab in inbasket, or \"Choose Columns\" in Meds & Orders section of the refill encounter.              Passed - Medication is active on med list        Passed - Patient is age 18 or older        Passed - No active pregnancy on record        Passed - Normal serum creatinine on file in past 12 months     Recent Labs   Lab Test 12/19/20  1458   CR 0.52       Ok to refill medication if creatinine is low          Passed - No positive pregnancy test in past 12 months           atorvastatin (LIPITOR) 40 MG tablet [Pharmacy Med Name: ATORVASTATIN CALCIUM 40MG TABS] 90 " "tablet 0     Sig: TAKE 1 TABLET BY MOUTH EVERY NIGHT AT BEDTIME       Statins Protocol Passed - 10/30/2021 12:23 PM        Passed - LDL on file in past 12 months     Recent Labs   Lab Test 12/19/20  1458   LDL 37             Passed - No abnormal creatine kinase in past 12 months     No lab results found.             Passed - Recent (12 mo) or future (30 days) visit within the authorizing provider's specialty     Patient has had an office visit with the authorizing provider or a provider within the authorizing providers department within the previous 12 mos or has a future within next 30 days. See \"Patient Info\" tab in inbasket, or \"Choose Columns\" in Meds & Orders section of the refill encounter.              Passed - Medication is active on med list        Passed - Patient is age 18 or older        Passed - No active pregnancy on record        Passed - No positive pregnancy test in past 12 months             Rajat Blanton RN 11/01/21 7:27 AM  "

## 2021-11-03 RX ORDER — TICAGRELOR 90 MG/1
TABLET ORAL
Qty: 90 TABLET | Refills: 0 | Status: SHIPPED | OUTPATIENT
Start: 2021-11-03

## 2021-11-03 RX ORDER — ATORVASTATIN CALCIUM 40 MG/1
TABLET, FILM COATED ORAL
Qty: 90 TABLET | Refills: 0 | Status: SHIPPED | OUTPATIENT
Start: 2021-11-03

## 2022-07-26 ENCOUNTER — PATIENT OUTREACH (OUTPATIENT)
Dept: GERIATRIC MEDICINE | Facility: CLINIC | Age: 77
End: 2022-07-26

## 2022-07-26 NOTE — PROGRESS NOTES
[facility] Care Coordination Contact    No longer active with [facility] community case management effective 4/1/21.  Reason for community disenrollment: Change Care System/PCC to [facility].     DANIEL Roca, Manager   [facility]  [phone]

## 2024-05-23 NOTE — ED NOTES
[facility]  ED Nurse Handoff Report    Toshia Vela is a 75 year old female   ED Chief complaint: Nausea, Vomiting, and Generalized Weakness  . ED Diagnosis:   Final diagnoses:   Cerebrovascular accident (CVA), unspecified mechanism (H)   Chest pain, unspecified type     Allergies: No Known Allergies    Code Status: Prior  Activity level - Baseline/Home:  Independent. Activity Level - Current:   Stand by Assist. Lift room needed: No. Bariatric: No   Needed: Yes   Isolation: No. Infection: Not Applicable.     Vital Signs:   Vitals:    12/17/20 1900 12/17/20 1905 12/17/20 1910 12/17/20 1930   BP:    (!) 168/90   Pulse:    78   Resp:       SpO2: 96% 100% 100% 100%       Cardiac Rhythm:  ,   Cardiac  Cardiac Rhythm: Normal sinus rhythm  Pain level:    Patient confused: No. Patient Falls Risk: Yes.   Elimination Status: Has voided   Patient Report - Initial Complaint: Patient brought in by EMS. Developed N/V/weakness today per daughter. Daughter concerned for stroke. Patient with hx of stroke in 90s. Vitals stable per EMS. . Triage for EMS limited d/t language barrier.      Zofran 4mg administered in rig.         Patient stated via  that around 1000 she developed vomiting/dizziness/chest pain.      No deficits noted by EMS or RN. Weakness on left side from previous stroke. . Focused Assessment: Cognitive - Orientation: oriented x 4  Speech: clear  Best Language: 0 - No aphasia (noted )  [place] Coma Scale - Best Eye Response: 4-->(E4) spontaneous  Best Motor Response: 6-->(M6) obeys commands  Best Verbal Response: 5-->(V5) oriented  [place] Coma Scale Score: 15   Motor Strength - Left Upper: 4 - active movement against gravity and some resistance  Right Upper: 4 - active movement against gravity and some resistance  Left Lower: 4 - active movement against gravity and some resistance  Right Lower: 4 - active movement against gravity and some resistance   Sensory Assessment - BEVERLEY  Pain Sensation: WNL  RUE Pain Sensation: WNL  LLE Pain Sensation: WNL  RLE Pain Sensation: WNL   Additional Neuro Documentation - Headache: None    Tests Performed: CT, Labs, MRI. Abnormal Results:   MRA Brain ([place] [facility] [place]) wo Contrast   Final Result   IMPRESSION:      HEAD MRA:    1.  Stenoses noted in the left MCA bifurcation, detailed above. No evidence of large vessel occlusion.      2.  Moderately severe focal stenosis P2 segment of the right posterior cerebral artery.      NECK MRA:   1.  Dominant right vertebral artery with small caliber left vertebral that affectively ends in the left posterior inferior cerebellar artery.      MRA Angiogram Neck w/o Contrast   Final Result   IMPRESSION:      HEAD MRA:    1.  Stenoses noted in the left MCA bifurcation, detailed above. No evidence of large vessel occlusion.      2.  Moderately severe focal stenosis P2 segment of the right posterior cerebral artery.      NECK MRA:   1.  Dominant right vertebral artery with small caliber left vertebral that affectively ends in the left posterior inferior cerebellar artery.      MR Brain w/o Contrast   Final Result   IMPRESSION:   1.  Focal, acute/subacute nonhemorrhagic infarct right parietal lobe near the vertex. There may be mild associated gyral swelling but without other mass effect. No other mass effect.      2.  Numerous supratentorial and infratentorial infarcts as detailed above.      3.  Atrophy and chronic small vessel vascular changes.      4.  Linear-type infarct also noted in the right paramedian midbrain.      CT Aortic Survey w Contrast   Final Result   IMPRESSION:   1.  Thoracic and abdominal aorta are unremarkable.   2.  The lungs are clear. No evidence for acute pulmonary disease.   3.  Esophageal hiatal hernia.   4.  Fatty liver.           Labs Ordered and Resulted from Time of ED Arrival Up to the Time of Departure from the ED   GLUCOSE BY METER - Abnormal; Notable for the following components:        Result Value    Glucose 113 (*)     All other components within normal limits   BASIC METABOLIC PANEL - Abnormal; Notable for the following components:    Potassium 3.2 (*)     Glucose 117 (*)     All other components within normal limits   CBC WITH PLATELETS DIFFERENTIAL   TROPONIN I   INR   INFLUENZA A/B & SARS-COV2 PCR MULTIPLEX     .   Treatments provided: Aspirin, Antivert, Zofran, Fluids   Family Comments: Daughter updated via phone   OBS brochure/video discussed/provided to patient:  N/A  ED Medications:   Medications   ondansetron (ZOFRAN) injection 4 mg (4 mg Intravenous Given 12/17/20 1741)   sodium chloride (PF) 0.9% PF flush 60 mL (60 mLs Intravenous Not Given 12/17/20 2012)   gadobutrol (GADAVIST) injection 10 mL (10 mLs Intravenous Not Given 12/17/20 2011)   0.9% sodium chloride BOLUS (1,000 mLs Intravenous New Bag 12/17/20 1741)   meclizine (ANTIVERT) tablet 25 mg (25 mg Oral Given 12/17/20 1741)   aspirin (ASA) tablet 325 mg (325 mg Oral Given 12/17/20 1741)   CT SCAN FLUSH (60 mLs Intravenous Given 12/17/20 1831)   iopamidol (ISOVUE-370) solution 500 mL (80 mLs Intravenous Given 12/17/20 1830)     Drips infusing:  No  For the majority of the shift, the patient's behavior Green. Interventions performed were NA.    Sepsis treatment initiated: No     Patient tested for COVID 19 prior to admission: YES    ED Nurse Name/Phone Number: Mayte Fontanez RN,   9:31 PM       [FreeTextEntry2] : bilateral knees

## (undated) RX ORDER — REGADENOSON 0.08 MG/ML
INJECTION, SOLUTION INTRAVENOUS
Status: DISPENSED
Start: 2020-12-21